# Patient Record
Sex: FEMALE | Race: WHITE | NOT HISPANIC OR LATINO | Employment: OTHER | URBAN - METROPOLITAN AREA
[De-identification: names, ages, dates, MRNs, and addresses within clinical notes are randomized per-mention and may not be internally consistent; named-entity substitution may affect disease eponyms.]

---

## 2023-03-01 LAB
ESTIMATED AVERAGE GLUCOSE FOR HBA1C: 163 MG/DL
HEMOGLOBIN A1C/HEMOGLOBIN TOTAL IN BLOOD: 7.3 %

## 2023-03-02 LAB
ANION GAP IN SER/PLAS: 16 MMOL/L (ref 10–20)
CALCIDIOL (25 OH VITAMIN D3) (NG/ML) IN SER/PLAS: 67 NG/ML
CALCIUM (MG/DL) IN SER/PLAS: 8.7 MG/DL (ref 8.6–10.6)
CARBON DIOXIDE, TOTAL (MMOL/L) IN SER/PLAS: 31 MMOL/L (ref 21–32)
CHLORIDE (MMOL/L) IN SER/PLAS: 101 MMOL/L (ref 98–107)
CHOLESTEROL (MG/DL) IN SER/PLAS: 148 MG/DL (ref 0–199)
CHOLESTEROL IN HDL (MG/DL) IN SER/PLAS: 45.8 MG/DL
CHOLESTEROL/HDL RATIO: 3.2
CREATININE (MG/DL) IN SER/PLAS: 1.51 MG/DL (ref 0.5–1.05)
GFR FEMALE: 37 ML/MIN/1.73M2
GLUCOSE (MG/DL) IN SER/PLAS: 115 MG/DL (ref 74–99)
LDL: 51 MG/DL (ref 0–99)
NON HDL CHOLESTEROL: 102 MG/DL
PARATHYRIN INTACT (PG/ML) IN SER/PLAS: 141.4 PG/ML (ref 18.5–88)
POTASSIUM (MMOL/L) IN SER/PLAS: 4.7 MMOL/L (ref 3.5–5.3)
SODIUM (MMOL/L) IN SER/PLAS: 143 MMOL/L (ref 136–145)
TRIGLYCERIDE (MG/DL) IN SER/PLAS: 254 MG/DL (ref 0–149)
UREA NITROGEN (MG/DL) IN SER/PLAS: 30 MG/DL (ref 6–23)
VLDL: 51 MG/DL (ref 0–40)

## 2023-10-20 ENCOUNTER — APPOINTMENT (OUTPATIENT)
Dept: PRIMARY CARE | Facility: CLINIC | Age: 73
End: 2023-10-20
Payer: MEDICARE

## 2023-11-04 PROBLEM — M79.642 HAND PAIN, LEFT: Status: ACTIVE | Noted: 2023-11-04

## 2023-11-04 PROBLEM — N20.0 KIDNEY STONES: Status: ACTIVE | Noted: 2023-11-04

## 2023-11-04 PROBLEM — G47.62 NOCTURNAL LEG CRAMPS: Status: ACTIVE | Noted: 2023-11-04

## 2023-11-04 PROBLEM — I25.10 CORONARY ARTERY DISEASE: Status: ACTIVE | Noted: 2023-11-04

## 2023-11-04 PROBLEM — E11.9 DIABETES MELLITUS (MULTI): Status: ACTIVE | Noted: 2023-11-04

## 2023-11-04 PROBLEM — E21.1 SECONDARY HYPERPARATHYROIDISM, NON-RENAL (MULTI): Status: ACTIVE | Noted: 2023-11-04

## 2023-11-04 PROBLEM — L50.9 URTICARIA, UNSPECIFIED: Status: ACTIVE | Noted: 2023-11-04

## 2023-11-04 PROBLEM — E89.2 STATUS POST PARATHYROIDECTOMY (CMS/HCC): Status: ACTIVE | Noted: 2023-11-04

## 2023-11-04 PROBLEM — R07.89 CHEST PAIN, ATYPICAL: Status: ACTIVE | Noted: 2023-11-04

## 2023-11-04 PROBLEM — R92.8 ABNORMAL MAMMOGRAM: Status: ACTIVE | Noted: 2023-11-04

## 2023-11-04 PROBLEM — E04.1 THYROID NODULE: Status: ACTIVE | Noted: 2023-11-04

## 2023-11-04 PROBLEM — E55.9 VITAMIN D DEFICIENCY: Status: ACTIVE | Noted: 2023-11-04

## 2023-11-04 PROBLEM — R60.0 LEG EDEMA: Status: ACTIVE | Noted: 2023-11-04

## 2023-11-04 PROBLEM — E21.3 HYPERPARATHYROIDISM (MULTI): Status: ACTIVE | Noted: 2023-11-04

## 2023-11-04 PROBLEM — Z95.5 HISTORY OF CORONARY ARTERY STENT PLACEMENT: Status: ACTIVE | Noted: 2023-11-04

## 2023-11-04 PROBLEM — E78.5 HYPERLIPIDEMIA: Status: ACTIVE | Noted: 2023-11-04

## 2023-11-04 PROBLEM — I10 HTN (HYPERTENSION): Status: ACTIVE | Noted: 2023-11-04

## 2023-11-04 PROBLEM — M79.641 PAIN OF RIGHT HAND: Status: ACTIVE | Noted: 2023-11-04

## 2023-11-04 PROBLEM — N18.30 CHRONIC RENAL DISEASE, STAGE III (MULTI): Status: ACTIVE | Noted: 2023-11-04

## 2023-11-04 PROBLEM — D64.9 ACUTE ANEMIA: Status: ACTIVE | Noted: 2023-11-04

## 2023-11-04 PROBLEM — M19.90 ARTHRITIS: Status: ACTIVE | Noted: 2023-11-04

## 2023-11-04 PROBLEM — E11.40 NEUROPATHY, DIABETIC (MULTI): Status: ACTIVE | Noted: 2023-11-04

## 2023-11-04 PROBLEM — Z95.1 HISTORY OF CORONARY ARTERY BYPASS GRAFT X 3: Status: ACTIVE | Noted: 2023-11-04

## 2023-11-04 PROBLEM — M54.9 BACK PAIN: Status: ACTIVE | Noted: 2023-11-04

## 2023-11-04 PROBLEM — E66.01 OBESITY, MORBID (MULTI): Status: ACTIVE | Noted: 2023-11-04

## 2023-11-04 PROBLEM — I50.30 DIASTOLIC CONGESTIVE HEART FAILURE (MULTI): Status: ACTIVE | Noted: 2023-11-04

## 2023-11-04 RX ORDER — TORSEMIDE 20 MG/1
1 TABLET ORAL DAILY
COMMUNITY
Start: 2020-06-04 | End: 2023-11-07 | Stop reason: SDUPTHER

## 2023-11-04 RX ORDER — METOPROLOL SUCCINATE 25 MG/1
1 TABLET, EXTENDED RELEASE ORAL DAILY
COMMUNITY
Start: 2018-02-15 | End: 2024-04-23

## 2023-11-04 RX ORDER — ASPIRIN 81 MG/1
1 TABLET ORAL DAILY
COMMUNITY
Start: 2021-07-01

## 2023-11-04 RX ORDER — ROSUVASTATIN CALCIUM 20 MG/1
1 TABLET, COATED ORAL DAILY
COMMUNITY
Start: 2018-07-03

## 2023-11-04 RX ORDER — POTASSIUM CITRATE 10 MEQ/1
1 TABLET, EXTENDED RELEASE ORAL DAILY
COMMUNITY
Start: 2019-09-27

## 2023-11-07 ENCOUNTER — OFFICE VISIT (OUTPATIENT)
Dept: CARDIOLOGY | Facility: CLINIC | Age: 73
End: 2023-11-07
Payer: MEDICARE

## 2023-11-07 VITALS
HEART RATE: 68 BPM | OXYGEN SATURATION: 93 % | SYSTOLIC BLOOD PRESSURE: 124 MMHG | HEIGHT: 69 IN | DIASTOLIC BLOOD PRESSURE: 76 MMHG | BODY MASS INDEX: 43.19 KG/M2 | WEIGHT: 291.6 LBS

## 2023-11-07 DIAGNOSIS — Z95.1 HISTORY OF CORONARY ARTERY BYPASS GRAFT X 3: ICD-10-CM

## 2023-11-07 DIAGNOSIS — I50.32 CHRONIC DIASTOLIC CONGESTIVE HEART FAILURE (MULTI): ICD-10-CM

## 2023-11-07 DIAGNOSIS — E78.2 MIXED HYPERLIPIDEMIA: ICD-10-CM

## 2023-11-07 DIAGNOSIS — I25.10 CORONARY ARTERY DISEASE INVOLVING NATIVE CORONARY ARTERY OF NATIVE HEART WITHOUT ANGINA PECTORIS: Primary | ICD-10-CM

## 2023-11-07 DIAGNOSIS — I10 PRIMARY HYPERTENSION: ICD-10-CM

## 2023-11-07 PROCEDURE — 3074F SYST BP LT 130 MM HG: CPT | Performed by: INTERNAL MEDICINE

## 2023-11-07 PROCEDURE — 1159F MED LIST DOCD IN RCRD: CPT | Performed by: INTERNAL MEDICINE

## 2023-11-07 PROCEDURE — 1126F AMNT PAIN NOTED NONE PRSNT: CPT | Performed by: INTERNAL MEDICINE

## 2023-11-07 PROCEDURE — 3078F DIAST BP <80 MM HG: CPT | Performed by: INTERNAL MEDICINE

## 2023-11-07 PROCEDURE — 99214 OFFICE O/P EST MOD 30 MIN: CPT | Performed by: INTERNAL MEDICINE

## 2023-11-07 PROCEDURE — 3051F HG A1C>EQUAL 7.0%<8.0%: CPT | Performed by: INTERNAL MEDICINE

## 2023-11-07 RX ORDER — INSULIN ASPART 100 [IU]/ML
15 INJECTION, SOLUTION INTRAVENOUS; SUBCUTANEOUS
COMMUNITY
Start: 2023-09-01

## 2023-11-07 RX ORDER — INSULIN GLARGINE 100 [IU]/ML
35 INJECTION, SOLUTION SUBCUTANEOUS 2 TIMES DAILY
COMMUNITY
Start: 2023-08-30

## 2023-11-07 RX ORDER — TORSEMIDE 20 MG/1
20 TABLET ORAL 2 TIMES DAILY
Qty: 180 TABLET | Refills: 3 | Status: SHIPPED | OUTPATIENT
Start: 2023-11-07 | End: 2024-11-06

## 2023-11-07 RX ORDER — ERGOCALCIFEROL 1.25 MG/1
1 CAPSULE ORAL
COMMUNITY
Start: 2023-10-18

## 2023-11-07 ASSESSMENT — ENCOUNTER SYMPTOMS
DYSPNEA ON EXERTION: 1
WEIGHT GAIN: 1

## 2023-11-07 NOTE — PROGRESS NOTES
Subjective   Kasie Peña is a 73 y.o. female.    Chief Complaint:  Follow-up coronary artery disease.    HPI    She is now 3 years post coronary bypass grafting.  No anginal symptoms.  Activity levels however are quite limited.  Mostly she is very concerned about her weight.  She had a great deal of difficulty trying to lose weight.  According the patient she has been a lifetime member to weight watchers.  Tolerating medications well.  No other medical issues or medical problems since her last visit.  Has had a history of kidney stones.    She presented to [facility] in 2020 with symptoms of unstable angina. Cardiac catheterization demonstrated severe three-vessel coronary artery disease. She had a 50% ostial stenosis of the left main coronary artery. The left anterior descending had a 60% stenosis. The circumflex had multiple lesions. The right coronary artery had a 50% ostial stenosis.     On May 4, 2020 she underwent coronary artery bypass grafting with a left internal mammary graft to the anterior descending, vein graft to the circumflex, a vein graft to the posterior descending branch of the right coronary artery.     Her past medical history is significant for hypertension, hyperlipidemia and diabetes. She does have a family history of heart disease.     In the past, she smoked two packs per day (but quit many years ago).     She has had a cholecystectomy and appendectomy.    Allergies  Medication    · Biaxin   Rash; Updated By: Adela Kendrick; 2021 3:33:54 PM     Family History  Mother    · Family history of coronary artery disease (V17.3) (Z82.49)   · Family history of diabetes mellitus (V18.0) (Z83.3)  Father    · Family history of coronary artery disease (V17.3) (Z82.49)   · Family history of diabetes mellitus (V18.0) (Z83.3)   · Family history of myocardial infarction (V17.3) (Z82.49)   ·  age 67 from MI  Sister    · Family history of diabetes mellitus (V18.0)  "(Z83.3)     Social History  Problems    · Former smoker (V15.82) (Z87.891)   ·    · Minimum alcohol consumption      Review of Systems   Constitutional: Positive for malaise/fatigue and weight gain.   Cardiovascular:  Positive for dyspnea on exertion. Negative for chest pain.   All other systems reviewed and are negative.      Visit Vitals  /76 (BP Location: Left arm, Patient Position: Sitting, BP Cuff Size: Large adult)   Pulse 68   Ht 1.753 m (5' 9\")   Wt 132 kg (291 lb 9.6 oz)   SpO2 93%   BMI 43.06 kg/m²   BSA 2.54 m²        Objective     Constitutional:       Appearance: Not in distress.   Neck:      Vascular: JVD normal.   Pulmonary:      Breath sounds: Normal breath sounds.   Cardiovascular:      Normal rate. Regular rhythm. Normal S1. Normal S2.       Murmurs: There is no murmur.      No gallop.    Pulses:     Intact distal pulses.   Edema:     Peripheral edema present.     Pretibial: bilateral 2+ edema of the pretibial area.     Ankle: bilateral 1+ edema of the ankle.  Abdominal:      General: There is no distension.      Palpations: Abdomen is soft.   Neurological:      Mental Status: Alert.         Lab Review:   Lab Results   Component Value Date     03/01/2023    K 4.7 03/01/2023     03/01/2023    CO2 31 03/01/2023    BUN 30 (H) 03/01/2023    CREATININE 1.51 (H) 03/01/2023    GLUCOSE 115 (H) 03/01/2023    CALCIUM 8.7 03/01/2023     Lab Results   Component Value Date    CHOL 148 03/01/2023    TRIG 254 (H) 03/01/2023    HDL 45.8 03/01/2023       Assessment:    1.  Coronary artery disease.  Status post coronary bypass grafting x3 in 2020.  Continue medical management.    2.  Hypertension.  Blood pressures are well controlled.    3.  Hyperlipidemia.  Most recent LDL is 56 on rosuvastatin therapy.    4.  Exertional dyspnea.  Probable underlying chronic obstructive lung disease secondary to long smoking history.    5.  Diastolic congestive heart failure.  On torsemide 20 mg daily.  " Does have increasing peripheral edema.  Both lower extremities are somewhat tense.  We are going to have her take torsemide 40 mg 3 days/week and 20 mg on the other days.  Most recent potassium is 4.7.  We can have her get blood work with her new primary care physician in the future.

## 2023-11-20 ENCOUNTER — APPOINTMENT (OUTPATIENT)
Dept: PRIMARY CARE | Facility: CLINIC | Age: 73
End: 2023-11-20
Payer: MEDICARE

## 2023-11-27 DIAGNOSIS — E11.9 TYPE 2 DIABETES MELLITUS WITHOUT COMPLICATION, WITHOUT LONG-TERM CURRENT USE OF INSULIN (MULTI): Primary | ICD-10-CM

## 2023-11-27 NOTE — TELEPHONE ENCOUNTER
THIS IS GOING TO BE A NEW PATIENT.  SHE WAS SCHEDULED FOR TOMORROW BUT NEEDED TO CANCEL DUE TO BEING SICK WITH COVID.  SHE RESCHEDULED HER APT TO THE BEGINNING OF DECEMBER WITH YOU BUT IS OUT OF HER  ONE TOUCH BHARATH TEST STRIPS  SHE IS TESTING 2-3 DAILY  [facility]

## 2023-11-28 ENCOUNTER — APPOINTMENT (OUTPATIENT)
Dept: PRIMARY CARE | Facility: CLINIC | Age: 73
End: 2023-11-28
Payer: MEDICARE

## 2023-12-04 ENCOUNTER — TELEPHONE (OUTPATIENT)
Dept: PRIMARY CARE | Facility: CLINIC | Age: 73
End: 2023-12-04
Payer: MEDICARE

## 2023-12-04 NOTE — TELEPHONE ENCOUNTER
PT HAS APPT 12/6/23. WAS COVID + 11/26/23 AND STILL IS TESTING + 11 DAYS LATER. CAN SHE COME IN OR RESCHEDULE  STILL CONGESTED AND MAY HAVE SINUS INFECTION. SHE HAS RX FOR AUGMENTIN AT HOME. CAN SHE TAKE IT?

## 2023-12-05 NOTE — TELEPHONE ENCOUNTER
Informed pt to not test anymore since can remain positive for weeks. Informed her she can come into office whenever shed like and can take augmentin. Pt did not want new script.

## 2023-12-06 ENCOUNTER — OFFICE VISIT (OUTPATIENT)
Dept: PRIMARY CARE | Facility: CLINIC | Age: 73
End: 2023-12-06
Payer: MEDICARE

## 2023-12-06 VITALS
BODY MASS INDEX: 41.18 KG/M2 | HEIGHT: 69 IN | DIASTOLIC BLOOD PRESSURE: 72 MMHG | HEART RATE: 72 BPM | SYSTOLIC BLOOD PRESSURE: 136 MMHG | WEIGHT: 278 LBS | OXYGEN SATURATION: 98 %

## 2023-12-06 DIAGNOSIS — I50.32 CHRONIC DIASTOLIC CONGESTIVE HEART FAILURE (MULTI): ICD-10-CM

## 2023-12-06 DIAGNOSIS — Z00.00 WELLNESS EXAMINATION: Primary | ICD-10-CM

## 2023-12-06 DIAGNOSIS — N18.30 STAGE 3 CHRONIC KIDNEY DISEASE, UNSPECIFIED WHETHER STAGE 3A OR 3B CKD (MULTI): ICD-10-CM

## 2023-12-06 DIAGNOSIS — E11.9 TYPE 2 DIABETES MELLITUS WITHOUT COMPLICATION, WITH LONG-TERM CURRENT USE OF INSULIN (MULTI): ICD-10-CM

## 2023-12-06 DIAGNOSIS — E78.5 HYPERLIPIDEMIA, UNSPECIFIED HYPERLIPIDEMIA TYPE: ICD-10-CM

## 2023-12-06 DIAGNOSIS — E21.3 HYPERPARATHYROIDISM (MULTI): ICD-10-CM

## 2023-12-06 DIAGNOSIS — E66.01 OBESITY, MORBID (MULTI): ICD-10-CM

## 2023-12-06 DIAGNOSIS — I10 HYPERTENSION, UNSPECIFIED TYPE: ICD-10-CM

## 2023-12-06 DIAGNOSIS — Z79.4 TYPE 2 DIABETES MELLITUS WITHOUT COMPLICATION, WITH LONG-TERM CURRENT USE OF INSULIN (MULTI): ICD-10-CM

## 2023-12-06 DIAGNOSIS — I25.10 CORONARY ARTERY DISEASE INVOLVING NATIVE CORONARY ARTERY OF NATIVE HEART WITHOUT ANGINA PECTORIS: ICD-10-CM

## 2023-12-06 LAB — POC HEMOGLOBIN A1C: 7.4 % (ref 4.2–6.5)

## 2023-12-06 PROCEDURE — 1160F RVW MEDS BY RX/DR IN RCRD: CPT | Performed by: INTERNAL MEDICINE

## 2023-12-06 PROCEDURE — 3075F SYST BP GE 130 - 139MM HG: CPT | Performed by: INTERNAL MEDICINE

## 2023-12-06 PROCEDURE — 99214 OFFICE O/P EST MOD 30 MIN: CPT | Performed by: INTERNAL MEDICINE

## 2023-12-06 PROCEDURE — 1159F MED LIST DOCD IN RCRD: CPT | Performed by: INTERNAL MEDICINE

## 2023-12-06 PROCEDURE — 1036F TOBACCO NON-USER: CPT | Performed by: INTERNAL MEDICINE

## 2023-12-06 PROCEDURE — 3078F DIAST BP <80 MM HG: CPT | Performed by: INTERNAL MEDICINE

## 2023-12-06 PROCEDURE — G0439 PPPS, SUBSEQ VISIT: HCPCS | Performed by: INTERNAL MEDICINE

## 2023-12-06 PROCEDURE — 3051F HG A1C>EQUAL 7.0%<8.0%: CPT | Performed by: INTERNAL MEDICINE

## 2023-12-06 PROCEDURE — 83036 HEMOGLOBIN GLYCOSYLATED A1C: CPT | Performed by: INTERNAL MEDICINE

## 2023-12-06 PROCEDURE — 1126F AMNT PAIN NOTED NONE PRSNT: CPT | Performed by: INTERNAL MEDICINE

## 2023-12-06 PROCEDURE — 1170F FXNL STATUS ASSESSED: CPT | Performed by: INTERNAL MEDICINE

## 2023-12-06 ASSESSMENT — ACTIVITIES OF DAILY LIVING (ADL)
DRESSING: INDEPENDENT
TAKING_MEDICATION: INDEPENDENT
GROCERY_SHOPPING: INDEPENDENT
BATHING: INDEPENDENT
MANAGING_FINANCES: INDEPENDENT
DOING_HOUSEWORK: INDEPENDENT

## 2023-12-06 ASSESSMENT — PATIENT HEALTH QUESTIONNAIRE - PHQ9
SUM OF ALL RESPONSES TO PHQ9 QUESTIONS 1 AND 2: 0
SUM OF ALL RESPONSES TO PHQ9 QUESTIONS 1 AND 2: 0
1. LITTLE INTEREST OR PLEASURE IN DOING THINGS: NOT AT ALL
2. FEELING DOWN, DEPRESSED OR HOPELESS: NOT AT ALL
1. LITTLE INTEREST OR PLEASURE IN DOING THINGS: NOT AT ALL
2. FEELING DOWN, DEPRESSED OR HOPELESS: NOT AT ALL

## 2023-12-06 ASSESSMENT — COLUMBIA-SUICIDE SEVERITY RATING SCALE - C-SSRS
2. HAVE YOU ACTUALLY HAD ANY THOUGHTS OF KILLING YOURSELF?: NO
6. HAVE YOU EVER DONE ANYTHING, STARTED TO DO ANYTHING, OR PREPARED TO DO ANYTHING TO END YOUR LIFE?: NO
1. IN THE PAST MONTH, HAVE YOU WISHED YOU WERE DEAD OR WISHED YOU COULD GO TO SLEEP AND NOT WAKE UP?: NO

## 2023-12-06 NOTE — PROGRESS NOTES
"Subjective   Patient ID: Kasie Peña is a 73 y.o. female who presents for the following  MEDICARE WELLNESS 12/6/23 AND THE FOLLOWING   Assessment/Plan   DM2: stable, A1c 7.4 (12/6/2023)  Morning sugars 120s   Basaglar 35 units bid   Novolog 14 units TID   CAD triple May 2020:  Asprin crestor metoprolol dr Villagomez    Vitamin d deficiency: stable     Hyperparathyroid s/p parathyroidectomy (3 of 4 nodules removed): stable, asymptomatic at this time  check pth and I calcium     DIASTOLIC CHF: stable euvolemic at this time     CKD stage 3 (cr 1.6-1.9): bmp and urine albumin  Avoid nsaids and dehydration    OA: recommend tylenol as needed and topical biofreeze    Kidney stones: stable patient takes potassium citrate ER 10meq follows with dr carvajal     Morbid obesity: stable, recommend diet and weight loss    Mammogram done April 2023     Patient defers colon cancer screening at this time but may consider spring 2024     HPI    Diabetes Type 2: patient denies any low blood sugars. Patient is following with opthalmology on a yearly basis. Patient is taking    HTN: patient denies any headaches, blurred vision or dizziness. patient denies any stroke like symptoms    CAD: denies any angina. denies any sob chest pain or pressure especially with exertion. patient follows with cardiology on a regular basis    social: patient denies any smoking, drug or alcohol abuse    family history: DM and heart attack issues in the family     surgical history:      Visit Vitals  /72   Pulse 72   Ht 1.753 m (5' 9\")   Wt 126 kg (278 lb)   SpO2 98%   BMI 41.05 kg/m²   Smoking Status Never   BSA 2.48 m²     PHYSICAL EXAM     General appearance: Alert and in no acute distress. speech is clear and coherent  HEENT: Sclera and conjunctiva white, EOMI, uvela midline, no mouth lesions. PERRLA,  TM's Pan with cone of light, external ear canal with scant cerumen. No head trauma  Neck: no carotid bruits or thyromegaly. no lymphadenopathy   " Respiratory : No respiratory distress, normal respiratory rhythm and effort. Clear bilateral breath sounds. No wheezing or rhonchi.   Cardiovascular: heart rate regular, S1, S2. no murmurs. no Lower extremity edema  Skin inspection: Normal skin color and pigmentation, normal skin turgor and no visible rash, induration, or cellulitis  MSK: 5/5 strength upper and lower extremities without gait abnormalities. no loss of muscle mass   Neuro: 2-12 CN grossly intact.  no slurred speech. no lateralizing deficit  Psychiatric Orientation: Oriented to person, place, and time. no depression, homicidal or suicidal thoughts, normal affect     REVIEW OF SYSTEMS     Constitutional: not feeling tired and no fever, chills or sweats.  no headache  Cardiovascular: no exertional chest pain, no palpitations, no lower extremity edema and no intermittent leg claudication.   Lungs: Denies shortness of breath, exertional dyspnea, wheezing  Gastrointestinal: no change in bowel habits, no diarrhea, no nausea, no vomiting and no abdominal pain. Denies Melena, brbpr or dark stool  Musculoskeletal: no myalgias, no muscle weakness and no limb swelling.   Skin: no rashes, no change in skin color and pigmentation and no skin lumps.   Neurological: no headaches, no seizures, no numbness, no lateralizing deficits and no fainting.   Psychiatric: no depression and no anxiety.   Urine: denies polyuria, hematuria, dysuria   Endocrine: no cold intolerance, no heat intolerance    Allergies   Allergen Reactions    Bactrim [Sulfamethoxazole-Trimethoprim] Swelling       Current Outpatient Medications   Medication Sig Dispense Refill    aspirin 81 mg EC tablet Take 1 tablet (81 mg) by mouth once daily.      Basaglar KwikPen U-100 Insulin 100 unit/mL (3 mL) pen Inject 35 Units under the skin 2 times a day.      blood sugar diagnostic strip Test blood sugars two to three times daily. 200 each 3    ergocalciferol (Vitamin D-2) 1.25 MG (41730 UT) capsule Take 1  capsule (1,250 mcg) by mouth 1 (one) time per week.      metoprolol succinate XL (Toprol-XL) 25 mg 24 hr tablet Take 1 tablet (25 mg) by mouth once daily.      NovoLOG FlexPen U-100 Insulin 100 unit/mL (3 mL) pen Inject 15 Units under the skin 3 times a day with meals.      potassium citrate CR (Urocit-K-10) 10 mEq ER tablet Take 1 tablet (10 mEq) by mouth once daily.      rosuvastatin (Crestor) 20 mg tablet Take 1 tablet (20 mg) by mouth once daily.      torsemide (Demadex) 20 mg tablet Take 1 tablet (20 mg) by mouth 2 times a day. 180 tablet 3     No current facility-administered medications for this visit.       Objective     No visits with results within 4 Month(s) from this visit.   Latest known visit with results is:   Orders Only on 03/01/2023   Component Date Value Ref Range Status    Cholesterol 03/01/2023 148  0 - 199 mg/dL Final    HDL 03/01/2023 45.8  mg/dL Final    Cholesterol/HDL Ratio 03/01/2023 3.2   Final    LDL 03/01/2023 51  0 - 99 mg/dL Final    VLDL 03/01/2023 51 (H)  0 - 40 mg/dL Final    Triglycerides 03/01/2023 254 (H)  0 - 149 mg/dL Final    Non HDL Cholesterol 03/01/2023 102  mg/dL Final       Radiology: Reviewed imaging in powerchart.  No results found.    No family history on file.  Social History     Socioeconomic History    Marital status:      Spouse name: None    Number of children: None    Years of education: None    Highest education level: None   Occupational History    None   Tobacco Use    Smoking status: Never    Smokeless tobacco: Never   Substance and Sexual Activity    Alcohol use: Never    Drug use: Never    Sexual activity: None   Other Topics Concern    None   Social History Narrative    None     Social Determinants of Health     Financial Resource Strain: Not on file   Food Insecurity: Not on file   Transportation Needs: Not on file   Physical Activity: Not on file   Stress: Not on file   Social Connections: Not on file   Intimate Partner Violence: Not on file    Housing Stability: Not on file     Past Medical History:   Diagnosis Date    Essential (primary) hypertension     Benign essential HTN    Old myocardial infarction     History of myocardial infarction    Personal history of other diseases of the circulatory system 02/15/2018    History of coronary artery disease    Personal history of other endocrine, nutritional and metabolic disease 06/15/2018    History of hyperlipidemia    Personal history of other endocrine, nutritional and metabolic disease     History of hypercholesterolemia     Past Surgical History:   Procedure Laterality Date    APPENDECTOMY  03/19/2014    Appendectomy    CHOLECYSTECTOMY  03/19/2014    Cholecystectomy    OOPHORECTOMY  03/19/2014    Oophorectomy    OTHER SURGICAL HISTORY  06/04/2020    Coronary artery bypass graft    OTHER SURGICAL HISTORY  06/28/2016    Cath Stent Placement Number Of Stents Placed:    TONSILLECTOMY  03/19/2014    Tonsillectomy       Charting was completed using voice recognition technology and may include unintended errors.

## 2023-12-08 ENCOUNTER — TELEPHONE (OUTPATIENT)
Dept: PRIMARY CARE | Facility: CLINIC | Age: 73
End: 2023-12-08
Payer: MEDICARE

## 2023-12-08 DIAGNOSIS — J01.90 ACUTE SINUSITIS, RECURRENCE NOT SPECIFIED, UNSPECIFIED LOCATION: ICD-10-CM

## 2023-12-08 DIAGNOSIS — J01.90 ACUTE SINUSITIS, RECURRENCE NOT SPECIFIED, UNSPECIFIED LOCATION: Primary | ICD-10-CM

## 2023-12-08 RX ORDER — AMOXICILLIN AND CLAVULANATE POTASSIUM 500; 125 MG/1; MG/1
500 TABLET, FILM COATED ORAL 3 TIMES DAILY
Qty: 30 TABLET | Refills: 0 | Status: SHIPPED | OUTPATIENT
Start: 2023-12-08 | End: 2023-12-08 | Stop reason: SDUPTHER

## 2023-12-08 RX ORDER — AMOXICILLIN AND CLAVULANATE POTASSIUM 500; 125 MG/1; MG/1
500 TABLET, FILM COATED ORAL 2 TIMES DAILY
Qty: 20 TABLET | Refills: 0 | Status: SHIPPED | OUTPATIENT
Start: 2023-12-08 | End: 2023-12-18

## 2023-12-08 NOTE — TELEPHONE ENCOUNTER
PT was in office 12/06/23 and  said if need be he would call in Augmentin.  They were talking about other things, but she now thinks this issue for her has turned into a sinus infection.      She is requesting Dr call in Augmentin as he suggested.

## 2024-04-08 ENCOUNTER — APPOINTMENT (OUTPATIENT)
Dept: PRIMARY CARE | Facility: CLINIC | Age: 74
End: 2024-04-08
Payer: MEDICARE

## 2024-04-15 ENCOUNTER — APPOINTMENT (OUTPATIENT)
Dept: PRIMARY CARE | Facility: CLINIC | Age: 74
End: 2024-04-15
Payer: MEDICARE

## 2024-04-18 PROBLEM — D64.9 ACUTE ANEMIA: Status: RESOLVED | Noted: 2023-11-04 | Resolved: 2024-04-18

## 2024-04-18 PROBLEM — I10 HTN (HYPERTENSION): Status: RESOLVED | Noted: 2023-11-04 | Resolved: 2024-04-18

## 2024-04-18 PROBLEM — R92.8 ABNORMAL MAMMOGRAM: Status: RESOLVED | Noted: 2023-11-04 | Resolved: 2024-04-18

## 2024-04-18 PROBLEM — L50.9 URTICARIA, UNSPECIFIED: Status: RESOLVED | Noted: 2023-11-04 | Resolved: 2024-04-18

## 2024-04-19 ENCOUNTER — APPOINTMENT (OUTPATIENT)
Dept: PRIMARY CARE | Facility: CLINIC | Age: 74
End: 2024-04-19
Payer: MEDICARE

## 2024-04-23 DIAGNOSIS — R07.89 CHEST PAIN, ATYPICAL: ICD-10-CM

## 2024-04-23 RX ORDER — METOPROLOL SUCCINATE 25 MG/1
25 TABLET, EXTENDED RELEASE ORAL DAILY
Qty: 90 TABLET | Refills: 3 | Status: SHIPPED | OUTPATIENT
Start: 2024-04-23

## 2024-05-01 LAB
NON-UH HIE A/G RATIO: 1.4
NON-UH HIE ALB: 3.8 G/DL (ref 3.4–5)
NON-UH HIE ALK PHOS: 105 UNIT/L (ref 45–117)
NON-UH HIE BILIRUBIN, TOTAL: 0.7 MG/DL (ref 0.3–1.2)
NON-UH HIE BUN/CREAT RATIO: 23.1
NON-UH HIE BUN: 37 MG/DL (ref 9–23)
NON-UH HIE CALCIUM, IONIZED: 1.15 MMOL/L (ref 1.12–1.32)
NON-UH HIE CALCIUM: 9 MG/DL (ref 8.7–10.4)
NON-UH HIE CALCULATED LDL CHOLESTEROL: 61 MG/DL (ref 60–130)
NON-UH HIE CALCULATED OSMOLALITY: 299 MOSM/KG (ref 275–295)
NON-UH HIE CHLORIDE: 107 MMOL/L (ref 98–107)
NON-UH HIE CHOLESTEROL: 127 MG/DL (ref 100–200)
NON-UH HIE CO2, VENOUS: 29 MMOL/L (ref 20–31)
NON-UH HIE CREATININE, URINE MG/DL: 41.4 MG/DL
NON-UH HIE CREATININE: 1.6 MG/DL (ref 0.5–0.8)
NON-UH HIE GFR AA: 38
NON-UH HIE GLOBULIN: 2.8 G/DL
NON-UH HIE GLOMERULAR FILTRATION RATE: 32 ML/MIN/1.73M?
NON-UH HIE GLUCOSE: 162 MG/DL (ref 74–106)
NON-UH HIE GOT: 15 UNIT/L (ref 15–37)
NON-UH HIE GPT: 15 UNIT/L (ref 10–49)
NON-UH HIE HCT: 36.7 % (ref 36–46)
NON-UH HIE HDL CHOLESTEROL: 47 MG/DL (ref 40–60)
NON-UH HIE HGB: 12.1 G/DL (ref 12–16)
NON-UH HIE I-PTH: 155 PG/ML (ref 18.4–80.1)
NON-UH HIE INSTR WBC ND: 6.1
NON-UH HIE K: 4.9 MMOL/L (ref 3.5–5.1)
NON-UH HIE MCH: 29.6 PG (ref 27–34)
NON-UH HIE MCHC: 33 G/DL (ref 32–37)
NON-UH HIE MCV: 89.6 FL (ref 80–100)
NON-UH HIE MICROALBUMIN, URINE MG/L: 20 MG/L
NON-UH HIE MICROALBUMIN/CREATININE RATIO: 48 MG MALB/GM CREAT (ref 0–30)
NON-UH HIE MPV: 10.1 FL (ref 7.4–10.4)
NON-UH HIE NA: 144 MMOL/L (ref 135–145)
NON-UH HIE PLATELET: 153 X10 (ref 150–450)
NON-UH HIE RBC: 4.1 X10 (ref 4.2–5.4)
NON-UH HIE RDW: 15.6 % (ref 11.5–14.5)
NON-UH HIE TOTAL CHOL/HDL CHOL RATIO: 2.7
NON-UH HIE TOTAL PROTEIN: 6.6 G/DL (ref 5.7–8.2)
NON-UH HIE TRIGLYCERIDES: 94 MG/DL (ref 30–150)
NON-UH HIE TSH: 4.34 UIU/ML (ref 0.55–4.78)
NON-UH HIE VIT D 25: 79 NG/ML
NON-UH HIE WBC: 6.1 X10 (ref 4.5–11)

## 2024-05-07 ENCOUNTER — OFFICE VISIT (OUTPATIENT)
Dept: CARDIOLOGY | Facility: CLINIC | Age: 74
End: 2024-05-07
Payer: MEDICARE

## 2024-05-07 VITALS
OXYGEN SATURATION: 98 % | BODY MASS INDEX: 43.4 KG/M2 | SYSTOLIC BLOOD PRESSURE: 126 MMHG | HEIGHT: 69 IN | HEART RATE: 60 BPM | WEIGHT: 293 LBS | DIASTOLIC BLOOD PRESSURE: 78 MMHG

## 2024-05-07 DIAGNOSIS — E66.01 OBESITY, MORBID (MULTI): ICD-10-CM

## 2024-05-07 DIAGNOSIS — I25.10 CORONARY ARTERY DISEASE INVOLVING NATIVE CORONARY ARTERY OF NATIVE HEART WITHOUT ANGINA PECTORIS: ICD-10-CM

## 2024-05-07 DIAGNOSIS — I50.32 CHRONIC DIASTOLIC CONGESTIVE HEART FAILURE (MULTI): ICD-10-CM

## 2024-05-07 DIAGNOSIS — R06.09 EXERTIONAL DYSPNEA: ICD-10-CM

## 2024-05-07 DIAGNOSIS — E78.2 MIXED HYPERLIPIDEMIA: ICD-10-CM

## 2024-05-07 DIAGNOSIS — Z95.5 HISTORY OF CORONARY ARTERY STENT PLACEMENT: ICD-10-CM

## 2024-05-07 DIAGNOSIS — R60.0 LEG EDEMA: ICD-10-CM

## 2024-05-07 DIAGNOSIS — R07.89 CHEST PAIN, ATYPICAL: Primary | ICD-10-CM

## 2024-05-07 DIAGNOSIS — Z95.1 HISTORY OF CORONARY ARTERY BYPASS GRAFT X 3: ICD-10-CM

## 2024-05-07 PROCEDURE — 93000 ELECTROCARDIOGRAM COMPLETE: CPT | Performed by: INTERNAL MEDICINE

## 2024-05-07 PROCEDURE — 1159F MED LIST DOCD IN RCRD: CPT | Performed by: INTERNAL MEDICINE

## 2024-05-07 PROCEDURE — 99214 OFFICE O/P EST MOD 30 MIN: CPT | Performed by: INTERNAL MEDICINE

## 2024-05-07 PROCEDURE — 3078F DIAST BP <80 MM HG: CPT | Performed by: INTERNAL MEDICINE

## 2024-05-07 PROCEDURE — 1036F TOBACCO NON-USER: CPT | Performed by: INTERNAL MEDICINE

## 2024-05-07 PROCEDURE — 3074F SYST BP LT 130 MM HG: CPT | Performed by: INTERNAL MEDICINE

## 2024-05-07 RX ORDER — PEN NEEDLE, DIABETIC 31 GX5/16"
NEEDLE, DISPOSABLE MISCELLANEOUS
COMMUNITY
Start: 2024-04-26

## 2024-05-07 ASSESSMENT — ENCOUNTER SYMPTOMS: DYSPNEA ON EXERTION: 1

## 2024-05-07 NOTE — PATIENT INSTRUCTIONS
We would recommend that you consider Ozempic or Monjaro for your diabetes and for weight loss.

## 2024-05-07 NOTE — PROGRESS NOTES
Subjective   Kasie Peña is a 73 y.o. female.    Chief Complaint:  Follow-up coronary artery disease.  Exertional dyspnea.    HPI    She is now 4 years post coronary artery bypass grafting which was performed in 2020.  She has had no anginal symptoms.  From a cardiac standpoint she has done well.  She continues to struggle with arthritis issues and with weight loss.  She has had no other medical issues and no hospitalizations or emergency room visits.    She presented to [facility] in 2020 with symptoms of unstable angina. Cardiac catheterization demonstrated severe three-vessel coronary artery disease. She had a 50% ostial stenosis of the left main coronary artery. The left anterior descending had a 60% stenosis. The circumflex had multiple lesions. The right coronary artery had a 50% ostial stenosis.     On May 4, 2020 she underwent coronary artery bypass grafting with a left internal mammary graft to the anterior descending, vein graft to the circumflex, a vein graft to the posterior descending branch of the right coronary artery.     Her past medical history is significant for hypertension, hyperlipidemia and diabetes. She does have a family history of heart disease.     In the past, she smoked two packs per day (but quit many years ago).     She has had a cholecystectomy and appendectomy.     Allergies  Medication    · Biaxin   Rash; Updated By: Adela Kendrick; 2021 3:33:54 PM     Family History  Mother    · Family history of coronary artery disease (V17.3) (Z82.49)   · Family history of diabetes mellitus (V18.0) (Z83.3)  Father    · Family history of coronary artery disease (V17.3) (Z82.49)   · Family history of diabetes mellitus (V18.0) (Z83.3)   · Family history of myocardial infarction (V17.3) (Z82.49)   ·  age 67 from MI  Sister    · Family history of diabetes mellitus (V18.0) (Z83.3)     Social History  Problems    · Former smoker (V15.82) (Z87.891)   ·  "   · Minimum alcohol consumption    Review of Systems   Constitutional: Positive for malaise/fatigue.   Cardiovascular:  Positive for dyspnea on exertion.   Musculoskeletal:  Positive for arthritis.   All other systems reviewed and are negative.      Visit Vitals  /78 (BP Location: Right arm)   Pulse 60   Ht 1.753 m (5' 9\")   Wt 133 kg (293 lb)   SpO2 98%   BMI 43.27 kg/m²   Smoking Status Never   BSA 2.54 m²        Objective     Constitutional:       Appearance: Not in distress.   Neck:      Vascular: JVD normal.   Pulmonary:      Breath sounds: Normal breath sounds.   Cardiovascular:      Normal rate. Regular rhythm. Normal S1. Normal S2.       Murmurs: There is no murmur.      No gallop.    Pulses:     Intact distal pulses.   Edema:     Pretibial: bilateral 1+ edema of the pretibial area.     Ankle: bilateral 1+ edema of the ankle.  Abdominal:      General: There is no distension.      Palpations: Abdomen is soft.   Neurological:      Mental Status: Alert.         Lab Review:   Lab Results   Component Value Date     03/01/2023    K 4.7 03/01/2023     03/01/2023    CO2 31 03/01/2023    BUN 30 (H) 03/01/2023    CREATININE 1.51 (H) 03/01/2023    GLUCOSE 115 (H) 03/01/2023    CALCIUM 8.7 03/01/2023       Assessment:    1.  Coronary artery disease.  Status post coronary bypass grafting.  Doing well 4 years post bypass.  No anginal symptoms.  Continue single antiplatelet therapy.    2.  Hypertension.  Blood pressures are normal on today's visit.    3.  Diastolic congestive heart failure.  No heart failure by exam.  Oxygen saturation is 98%.  Lungs are clear.    4.  Exertional dyspnea may have some underlying chronic obstructive lung disease secondary to a past smoking history.    5.  Obesity.  Have suggested that she look into the use of Ozempic or Mounjaro.  She is to discuss this with her primary care physician.  "

## 2024-05-14 ENCOUNTER — OFFICE VISIT (OUTPATIENT)
Dept: PRIMARY CARE | Facility: CLINIC | Age: 74
End: 2024-05-14
Payer: MEDICARE

## 2024-05-14 VITALS
WEIGHT: 293 LBS | OXYGEN SATURATION: 95 % | BODY MASS INDEX: 43.86 KG/M2 | DIASTOLIC BLOOD PRESSURE: 78 MMHG | HEART RATE: 58 BPM | SYSTOLIC BLOOD PRESSURE: 121 MMHG

## 2024-05-14 DIAGNOSIS — E78.2 MIXED HYPERLIPIDEMIA: ICD-10-CM

## 2024-05-14 DIAGNOSIS — E66.01 OBESITY, MORBID (MULTI): ICD-10-CM

## 2024-05-14 DIAGNOSIS — E21.3 HYPERPARATHYROIDISM (MULTI): ICD-10-CM

## 2024-05-14 DIAGNOSIS — Z79.4 TYPE 2 DIABETES MELLITUS WITHOUT COMPLICATION, WITH LONG-TERM CURRENT USE OF INSULIN (MULTI): ICD-10-CM

## 2024-05-14 DIAGNOSIS — E11.9 TYPE 2 DIABETES MELLITUS WITHOUT COMPLICATION, WITH LONG-TERM CURRENT USE OF INSULIN (MULTI): ICD-10-CM

## 2024-05-14 DIAGNOSIS — N18.30 STAGE 3 CHRONIC KIDNEY DISEASE, UNSPECIFIED WHETHER STAGE 3A OR 3B CKD (MULTI): ICD-10-CM

## 2024-05-14 LAB — POC HEMOGLOBIN A1C: 7.8 % (ref 4.2–6.5)

## 2024-05-14 PROCEDURE — 83036 HEMOGLOBIN GLYCOSYLATED A1C: CPT | Performed by: INTERNAL MEDICINE

## 2024-05-14 PROCEDURE — 1159F MED LIST DOCD IN RCRD: CPT | Performed by: INTERNAL MEDICINE

## 2024-05-14 PROCEDURE — 3078F DIAST BP <80 MM HG: CPT | Performed by: INTERNAL MEDICINE

## 2024-05-14 PROCEDURE — 1036F TOBACCO NON-USER: CPT | Performed by: INTERNAL MEDICINE

## 2024-05-14 PROCEDURE — 3074F SYST BP LT 130 MM HG: CPT | Performed by: INTERNAL MEDICINE

## 2024-05-14 PROCEDURE — 99214 OFFICE O/P EST MOD 30 MIN: CPT | Performed by: INTERNAL MEDICINE

## 2024-05-14 PROCEDURE — G2211 COMPLEX E/M VISIT ADD ON: HCPCS | Performed by: INTERNAL MEDICINE

## 2024-05-14 NOTE — PROGRESS NOTES
Subjective   Patient ID: Kasie Peña is a 73 y.o. female who presents for the following  Chronic medical care  MEDICARE WELLNESS 12/6/23    Assessment/Plan   DM2: stable,   A1c 7.4 (12/6/2023) --> 7.8 (5/14/2024)  Morning sugars 120s   Basaglar 35 units bid   Novolog 14 units TID     CAD triple May 2020:  Asprin crestor metoprolol dr Villagomez    Vitamin d deficiency: stable     Hyperparathyroid s/p parathyroidectomy (3 of 4 nodules removed): stable, asymptomatic at this time  check pth and I calcium     DIASTOLIC CHF: stable euvolemic at this time     CKD stage 3 (cr 1.6-1.9): bmp and urine albumin  Avoid nsaids and dehydration    OA: recommend tylenol as needed and topical biofreeze    Kidney stones: stable patient takes potassium citrate ER 10meq follows with dr carvajal     Morbid obesity: stable, recommend diet and weight loss; discussed     Mammogram done April 2023     Patient defers colon cancer screening at this time but may consider summer 2024     HPI    Diabetes Type 2: patient denies any low blood sugars. Patient is following with opthalmology on a yearly basis. Patient is taking    HTN: patient denies any headaches, blurred vision or dizziness. patient denies any stroke like symptoms    CAD: denies any angina. denies any sob chest pain or pressure especially with exertion. patient follows with cardiology on a regular basis    social: patient denies any smoking, drug or alcohol abuse    family history: DM and heart attack issues in the family          Visit Vitals  Wt 135 kg (297 lb)   BMI 43.86 kg/m²   Smoking Status Never   BSA 2.56 m²     PHYSICAL EXAM     General appearance: Alert and in no acute distress. speech is clear and coherent  HEENT: Sclera and conjunctiva white, EOMI,      Respiratory : No respiratory distress, normal respiratory rhythm and effort. Clear bilateral breath sounds. No wheezing or rhonchi.   Cardiovascular: heart rate regular, S1, S2. no murmurs. no Lower extremity edema  Skin  "inspection: Normal skin color and pigmentation, normal skin turgor and no visible rash, induration, or cellulitis  MSK: 5/5 strength upper and lower extremities without gait abnormalities. no loss of muscle mass   Neuro: 2-12 CN grossly intact.  no slurred speech. no lateralizing deficit  Psychiatric Orientation: Oriented to person, place, and time. no depression, homicidal or suicidal thoughts, normal affect     REVIEW OF SYSTEMS     Constitutional: not feeling tired and no fever, chills or sweats.  no headache  Cardiovascular: no exertional chest pain, no palpitations, no lower extremity edema    Lungs: Denies shortness of breath, exertional dyspnea, wheezing  Gastrointestinal: no change in bowel habits, no diarrhea, no nausea, no vomiting and no abdominal pain.    Musculoskeletal: no myalgias, no muscle weakness and no limb swelling.   Skin: no rashes, no change in skin color and pigmentation and no skin lumps.   Neurological: no headaches, no seizures, no numbness, no lateralizing deficits and no fainting.   Psychiatric: no depression and no anxiety.   Urine: denies polyuria, hematuria, dysuria      Allergies   Allergen Reactions    Bactrim [Sulfamethoxazole-Trimethoprim] Swelling       Current Outpatient Medications   Medication Sig Dispense Refill    aspirin 81 mg EC tablet Take 1 tablet (81 mg) by mouth once daily.      BD Ultra-Fine Mini Pen Needle 31 gauge x 3/16\" needle USE THREE TIMES DAILY FOR DM CONTROL AND MONITORING      blood sugar diagnostic strip Test blood sugars two to three times daily. 200 each 3    ergocalciferol (Vitamin D-2) 1.25 MG (55405 UT) capsule Take 1 capsule (1,250 mcg) by mouth 1 (one) time per week.      metoprolol succinate XL (Toprol-XL) 25 mg 24 hr tablet TAKE 1 TABLET BY MOUTH EVERY DAY 90 tablet 3    potassium citrate CR (Urocit-K-10) 10 mEq ER tablet Take 1 tablet (10 mEq) by mouth once daily.      rosuvastatin (Crestor) 20 mg tablet Take 1 tablet (20 mg) by mouth once daily. "      torsemide (Demadex) 20 mg tablet Take 1 tablet (20 mg) by mouth 2 times a day. 180 tablet 3    Basaglar KwikPen U-100 Insulin 100 unit/mL (3 mL) pen Inject 35 Units under the skin 2 times a day.      NovoLOG FlexPen U-100 Insulin 100 unit/mL (3 mL) pen Inject 15 Units under the skin 3 times a day with meals.       No current facility-administered medications for this visit.       Objective     Orders Only on 05/01/2024   Component Date Value Ref Range Status    [facility] Calcium, Ionized 05/01/2024 1.15  1.12 - 1.32 mmol/L Final    [facility] MCH 05/01/2024 29.6  27.0 - 34.0 pg Final    [facility] HCT 05/01/2024 36.7  36.0 - 46.0 % Final    [facility] Platelet 05/01/2024 153  150 - 450 x10 Final    [facility] RBC 05/01/2024 4.10 (L)  4.20 - 5.40 x10 Final    [facility] Instr WBC ND 05/01/2024 6.1   Final    [facility] MCHC 05/01/2024 33.0  32.0 - 37.0 g/dL Final    [facility] MCV 05/01/2024 89.6  80.0 - 100.0 fL Final    [facility] HGB 05/01/2024 12.1  12.0 - 16.0 g/dL Final    [facility] MPV 05/01/2024 10.1  7.4 - 10.4 fL Final    [facility] WBC 05/01/2024 6.1  4.5 - 11.0 x10 Final    [facility] RDW 05/01/2024 15.6 (H)  11.5 - 14.5 % Final    [facility] Total Protein 05/01/2024 6.6  5.7 - 8.2 g/dL Final    [facility] CO2, venous 05/01/2024 29.0  20.0 - 31.0 mmol/L Final    [facility] Glomerular Filtration R* 05/01/2024 32  mL/min/1.73m? Final    [facility] Calculated Osmolality 05/01/2024 299 (H)  275 - 295 mOsm/kg Final    [facility] K 05/01/2024 4.9  3.5 - 5.1 mmol/L Final    [facility] Globulin 05/01/2024 2.8  g/dL Final    [facility] BUN 05/01/2024 37 (H)  9 - 23 mg/dL Final    [facility] Calcium 05/01/2024 9.0  8.7 - 10.4 mg/dL Final    [facility] ALB 05/01/2024 3.8  3.4 - 5.0 g/dL Final    [facility] GOT 05/01/2024 15  15 - 37 unit/L Final    [facility] Glucose 05/01/2024 162 (H)  74 - 106 mg/dL Final    [facility] GFR AA 05/01/2024 38   Final    [facility] Chloride 05/01/2024 107  98 - 107 mmol/L Final     [facility] Bilirubin, Total 05/01/2024 0.70  0.30 - 1.20 mg/dL Final    [facility] A/G Ratio 05/01/2024 1.4   Final    [facility] Na 05/01/2024 144  135 - 145 mmol/L Final    [facility] BUN/Creat Ratio 05/01/2024 23.1   Final    [facility] Alk Phos 05/01/2024 105  45 - 117 unit/L Final    [facility] GPT 05/01/2024 15  10 - 49 unit/L Final    [facility] Creatinine 05/01/2024 1.6 (H)  0.5 - 0.8 mg/dL Final    [facility] Vit D 25 05/01/2024 79  ng/mL Final    [facility] Total Chol/HDL Chol Rat* 05/01/2024 2.7   Final    [facility] Triglycerides 05/01/2024 94  30 - 150 mg/dL Final    [facility] Cholesterol 05/01/2024 127  100 - 200 mg/dL Final    [facility] Calculated LDL Choleste* 05/01/2024 61  60 - 130 mg/dL Final    [facility] HDL Cholesterol 05/01/2024 47  40 - 60 mg/dL Final    [facility] TSH 05/01/2024 4.34  0.55 - 4.78 uIU/ml Final    [facility] Creatinine, Urine mg/dl 05/01/2024 41.4  mg/dL Final    [facility] Microalbumin, Urine mg/L 05/01/2024 20.0  mg/L Final    [facility] Microalbumin/Creatinine* 05/01/2024 48 (H)  0 - 30 mg MALB/gm CREAT Final    [facility] I-PTH 05/01/2024 155.0 (H)  18.4 - 80.1 pg/mL Final       Radiology: Reviewed imaging in powerchart.  No results found.    No family history on file.  Social History     Socioeconomic History    Marital status:      Spouse name: Not on file    Number of children: Not on file    Years of education: Not on file    Highest education level: Not on file   Occupational History    Not on file   Tobacco Use    Smoking status: Never    Smokeless tobacco: Never   Substance and Sexual Activity    Alcohol use: Never    Drug use: Never    Sexual activity: Not on file   Other Topics Concern    Not on file   Social History Narrative    Not on file     Social Determinants of Health     Financial Resource Strain: Not on file   Food Insecurity: Not on file   Transportation Needs: Not on file   Physical Activity: Not on file   Stress: Not on file   Social  Connections: Not on file   Intimate Partner Violence: Not on file   Housing Stability: Not on file     Past Medical History:   Diagnosis Date    Essential (primary) hypertension     Benign essential HTN    Old myocardial infarction     History of myocardial infarction    Personal history of other diseases of the circulatory system 02/15/2018    History of coronary artery disease    Personal history of other endocrine, nutritional and metabolic disease 06/15/2018    History of hyperlipidemia    Personal history of other endocrine, nutritional and metabolic disease     History of hypercholesterolemia     Past Surgical History:   Procedure Laterality Date    APPENDECTOMY  03/19/2014    Appendectomy    CHOLECYSTECTOMY  03/19/2014    Cholecystectomy    OOPHORECTOMY  03/19/2014    Oophorectomy    OTHER SURGICAL HISTORY  06/04/2020    Coronary artery bypass graft    OTHER SURGICAL HISTORY  06/28/2016    Cath Stent Placement Number Of Stents Placed:    TONSILLECTOMY  03/19/2014    Tonsillectomy       Charting was completed using voice recognition technology and may include unintended errors.

## 2024-06-24 DIAGNOSIS — E55.9 VITAMIN D DEFICIENCY: ICD-10-CM

## 2024-06-24 RX ORDER — ERGOCALCIFEROL 1.25 MG/1
1 CAPSULE ORAL
Qty: 13 CAPSULE | Refills: 3 | Status: SHIPPED | OUTPATIENT
Start: 2024-06-24

## 2024-08-14 ENCOUNTER — APPOINTMENT (OUTPATIENT)
Dept: PRIMARY CARE | Facility: CLINIC | Age: 74
End: 2024-08-14
Payer: MEDICARE

## 2024-08-14 DIAGNOSIS — E78.2 MIXED HYPERLIPIDEMIA: ICD-10-CM

## 2024-08-14 RX ORDER — ROSUVASTATIN CALCIUM 20 MG/1
20 TABLET, COATED ORAL DAILY
Qty: 90 TABLET | Refills: 3 | Status: SHIPPED | OUTPATIENT
Start: 2024-08-14

## 2024-08-27 ENCOUNTER — APPOINTMENT (OUTPATIENT)
Dept: RADIOLOGY | Facility: CLINIC | Age: 74
End: 2024-08-27
Payer: MEDICARE

## 2024-08-27 DIAGNOSIS — Z12.31 SCREENING MAMMOGRAM FOR BREAST CANCER: ICD-10-CM

## 2024-08-28 ENCOUNTER — HOSPITAL ENCOUNTER (OUTPATIENT)
Dept: RADIOLOGY | Facility: CLINIC | Age: 74
Discharge: HOME | End: 2024-08-28
Payer: MEDICARE

## 2024-08-28 DIAGNOSIS — Z12.31 SCREENING MAMMOGRAM FOR BREAST CANCER: ICD-10-CM

## 2024-08-28 PROCEDURE — 77067 SCR MAMMO BI INCL CAD: CPT

## 2024-08-28 PROCEDURE — 77063 BREAST TOMOSYNTHESIS BI: CPT | Performed by: RADIOLOGY

## 2024-08-28 PROCEDURE — 77067 SCR MAMMO BI INCL CAD: CPT | Performed by: RADIOLOGY

## 2024-08-29 DIAGNOSIS — E11.9 TYPE 2 DIABETES MELLITUS WITHOUT COMPLICATION, WITH LONG-TERM CURRENT USE OF INSULIN (MULTI): ICD-10-CM

## 2024-08-29 DIAGNOSIS — Z79.4 TYPE 2 DIABETES MELLITUS WITHOUT COMPLICATION, WITH LONG-TERM CURRENT USE OF INSULIN (MULTI): ICD-10-CM

## 2024-08-31 RX ORDER — INSULIN ASPART 100 [IU]/ML
15 INJECTION, SOLUTION INTRAVENOUS; SUBCUTANEOUS
Qty: 10 ML | Refills: 3 | Status: SHIPPED | OUTPATIENT
Start: 2024-08-31

## 2024-08-31 RX ORDER — INSULIN GLARGINE 100 [IU]/ML
35 INJECTION, SOLUTION SUBCUTANEOUS 2 TIMES DAILY
Qty: 6 ML | Refills: 3 | Status: SHIPPED | OUTPATIENT
Start: 2024-08-31

## 2024-09-06 DIAGNOSIS — Z79.4 TYPE 2 DIABETES MELLITUS WITHOUT COMPLICATION, WITH LONG-TERM CURRENT USE OF INSULIN (MULTI): ICD-10-CM

## 2024-09-06 DIAGNOSIS — E11.9 TYPE 2 DIABETES MELLITUS WITHOUT COMPLICATION, WITH LONG-TERM CURRENT USE OF INSULIN (MULTI): ICD-10-CM

## 2024-09-06 RX ORDER — INSULIN GLARGINE 100 [IU]/ML
35 INJECTION, SOLUTION SUBCUTANEOUS 2 TIMES DAILY
Qty: 12 ML | Refills: 3 | Status: SHIPPED | OUTPATIENT
Start: 2024-09-06

## 2024-09-17 DIAGNOSIS — Z79.4 TYPE 2 DIABETES MELLITUS WITHOUT COMPLICATION, WITH LONG-TERM CURRENT USE OF INSULIN (MULTI): ICD-10-CM

## 2024-09-17 DIAGNOSIS — E11.9 TYPE 2 DIABETES MELLITUS WITHOUT COMPLICATION, WITH LONG-TERM CURRENT USE OF INSULIN (MULTI): ICD-10-CM

## 2024-09-17 RX ORDER — INSULIN GLARGINE 100 [IU]/ML
35 INJECTION, SOLUTION SUBCUTANEOUS 2 TIMES DAILY
Qty: 60 ML | Refills: 0 | Status: CANCELLED | OUTPATIENT
Start: 2024-09-17

## 2024-10-02 ENCOUNTER — LAB (OUTPATIENT)
Dept: LAB | Facility: LAB | Age: 74
End: 2024-10-02
Payer: MEDICARE

## 2024-10-02 ENCOUNTER — APPOINTMENT (OUTPATIENT)
Dept: PRIMARY CARE | Facility: CLINIC | Age: 74
End: 2024-10-02
Payer: MEDICARE

## 2024-10-02 VITALS
WEIGHT: 293 LBS | HEART RATE: 64 BPM | OXYGEN SATURATION: 98 % | HEIGHT: 69 IN | SYSTOLIC BLOOD PRESSURE: 118 MMHG | DIASTOLIC BLOOD PRESSURE: 68 MMHG | BODY MASS INDEX: 43.4 KG/M2

## 2024-10-02 DIAGNOSIS — E21.3 HYPERPARATHYROIDISM (MULTI): ICD-10-CM

## 2024-10-02 DIAGNOSIS — E11.9 TYPE 2 DIABETES MELLITUS WITHOUT COMPLICATION, WITH LONG-TERM CURRENT USE OF INSULIN (MULTI): ICD-10-CM

## 2024-10-02 DIAGNOSIS — E55.9 VITAMIN D DEFICIENCY: ICD-10-CM

## 2024-10-02 DIAGNOSIS — Z79.4 TYPE 2 DIABETES MELLITUS WITHOUT COMPLICATION, WITH LONG-TERM CURRENT USE OF INSULIN (MULTI): ICD-10-CM

## 2024-10-02 DIAGNOSIS — I10 HYPERTENSION, UNSPECIFIED TYPE: ICD-10-CM

## 2024-10-02 DIAGNOSIS — E78.2 MIXED HYPERLIPIDEMIA: ICD-10-CM

## 2024-10-02 DIAGNOSIS — I25.10 CORONARY ARTERY DISEASE INVOLVING NATIVE CORONARY ARTERY OF NATIVE HEART WITHOUT ANGINA PECTORIS: Primary | ICD-10-CM

## 2024-10-02 LAB
ALBUMIN SERPL BCP-MCNC: 4.6 G/DL (ref 3.4–5)
ALP SERPL-CCNC: 116 U/L (ref 33–136)
ALT SERPL W P-5'-P-CCNC: 11 U/L (ref 7–45)
ANION GAP SERPL CALC-SCNC: 13 MMOL/L (ref 10–20)
AST SERPL W P-5'-P-CCNC: 15 U/L (ref 9–39)
BILIRUB SERPL-MCNC: 0.9 MG/DL (ref 0–1.2)
BUN SERPL-MCNC: 29 MG/DL (ref 6–23)
CA-I BLD-SCNC: 1.1 MMOL/L (ref 1.1–1.33)
CALCIUM SERPL-MCNC: 9 MG/DL (ref 8.6–10.6)
CHLORIDE SERPL-SCNC: 102 MMOL/L (ref 98–107)
CO2 SERPL-SCNC: 33 MMOL/L (ref 21–32)
CREAT SERPL-MCNC: 1.62 MG/DL (ref 0.5–1.05)
EGFRCR SERPLBLD CKD-EPI 2021: 34 ML/MIN/1.73M*2
GLUCOSE SERPL-MCNC: 103 MG/DL (ref 74–99)
POC HEMOGLOBIN A1C: 7.4 % (ref 4.2–6.5)
POTASSIUM SERPL-SCNC: 4.7 MMOL/L (ref 3.5–5.3)
PROT SERPL-MCNC: 7 G/DL (ref 6.4–8.2)
SODIUM SERPL-SCNC: 143 MMOL/L (ref 136–145)

## 2024-10-02 PROCEDURE — 83970 ASSAY OF PARATHORMONE: CPT

## 2024-10-02 PROCEDURE — 3078F DIAST BP <80 MM HG: CPT | Performed by: INTERNAL MEDICINE

## 2024-10-02 PROCEDURE — 83036 HEMOGLOBIN GLYCOSYLATED A1C: CPT | Performed by: INTERNAL MEDICINE

## 2024-10-02 PROCEDURE — 3008F BODY MASS INDEX DOCD: CPT | Performed by: INTERNAL MEDICINE

## 2024-10-02 PROCEDURE — 1036F TOBACCO NON-USER: CPT | Performed by: INTERNAL MEDICINE

## 2024-10-02 PROCEDURE — 1123F ACP DISCUSS/DSCN MKR DOCD: CPT | Performed by: INTERNAL MEDICINE

## 2024-10-02 PROCEDURE — 80053 COMPREHEN METABOLIC PANEL: CPT

## 2024-10-02 PROCEDURE — 1159F MED LIST DOCD IN RCRD: CPT | Performed by: INTERNAL MEDICINE

## 2024-10-02 PROCEDURE — 3074F SYST BP LT 130 MM HG: CPT | Performed by: INTERNAL MEDICINE

## 2024-10-02 PROCEDURE — 82330 ASSAY OF CALCIUM: CPT

## 2024-10-02 PROCEDURE — G2211 COMPLEX E/M VISIT ADD ON: HCPCS | Performed by: INTERNAL MEDICINE

## 2024-10-02 PROCEDURE — 99214 OFFICE O/P EST MOD 30 MIN: CPT | Performed by: INTERNAL MEDICINE

## 2024-10-02 PROCEDURE — 36415 COLL VENOUS BLD VENIPUNCTURE: CPT

## 2024-10-02 NOTE — PROGRESS NOTES
"Subjective   Patient ID: Kasie Peña is a 74 y.o. female who presents for the following  Chronic medical care  MEDICARE WELLNESS 12/6/23    Assessment/Plan   DM2: stable,   A1c 7.4 (12/6/2023) --> 7.8 (5/14/2024) --> 7.4 (10/2/24)  Morning sugars 120s ; having lows after eating and she has to eat more.   Basaglar 35 units bid   Novolog 14 units TID --> 12 units (10/2/24)    CAD triple May 2020:  Asprin crestor metoprolol dr Villagomez    Vitamin d deficiency: stable     Hyperparathyroid s/p parathyroidectomy (3 of 4 nodules removed): stable, asymptomatic at this time  check pth and I calcium     DIASTOLIC CHF: stable euvolemic at this time     CKD stage 3 (cr 1.6-1.9): bmp and urine albumin  Avoid nsaids and dehydration    OA: recommend tylenol as needed and topical biofreeze    Kidney stones: stable patient takes potassium citrate ER 10meq follows with dr carvajal     Morbid obesity: stable, recommend diet and weight loss; discussed     Mammogram done April 2023     Patient defers colon cancer screening at this time but may consider summer 2024     HPI    Diabetes Type 2: patient denies any low blood sugars. Patient is following with opthalmology on a yearly basis. Patient is taking    HTN: patient denies any headaches, blurred vision or dizziness. patient denies any stroke like symptoms    CAD: denies any angina. denies any sob chest pain or pressure especially with exertion. patient follows with cardiology on a regular basis    social: patient denies any smoking, drug or alcohol abuse    family history: DM and heart attack issues in the family          Visit Vitals  Pulse 64   Ht 1.753 m (5' 9\")   Wt 136 kg (300 lb)   SpO2 98%   BMI 44.30 kg/m²   OB Status Postmenopausal   Smoking Status Never   BSA 2.57 m²     PHYSICAL EXAM     General appearance: Alert and in no acute distress. speech is clear and coherent  HEENT: Sclera and conjunctiva white, EOMI,      Respiratory : No respiratory distress, normal respiratory " "rhythm and effort. Clear bilateral breath sounds. No wheezing or rhonchi.   Cardiovascular: heart rate regular, S1, S2. no murmurs. no Lower extremity edema  Skin inspection: Normal skin color and pigmentation, normal skin turgor and no visible rash, induration, or cellulitis  MSK: 5/5 strength upper and lower extremities without gait abnormalities. no loss of muscle mass   Neuro: 2-12 CN grossly intact.  no slurred speech. no lateralizing deficit     REVIEW OF SYSTEMS     Constitutional: not feeling tired and no fever, chills or sweats.  no headache  Cardiovascular: no exertional chest pain, no palpitations, trace lower extremity edema    Lungs: Denies shortness of breath, exertional dyspnea, wheezing  Gastrointestinal: no change in bowel habits, no diarrhea, no nausea, no vomiting and no abdominal pain.    Musculoskeletal: no myalgias, no muscle weakness    Skin: no rashes, no change in skin color and pigmentation and no skin lumps.      Allergies   Allergen Reactions    Macrobid [Nitrofurantoin Monohyd/M-Cryst] Anaphylaxis and Rash       Current Outpatient Medications   Medication Sig Dispense Refill    aspirin 81 mg EC tablet Take 1 tablet (81 mg) by mouth once daily.      BD Ultra-Fine Mini Pen Needle 31 gauge x 3/16\" needle USE THREE TIMES DAILY FOR DM CONTROL AND MONITORING      blood sugar diagnostic strip Test blood sugars two to three times daily. 200 each 3    ergocalciferol (Vitamin D-2) 1.25 MG (75359 UT) capsule Take 1 capsule (1,250 mcg) by mouth 1 (one) time per week. 13 capsule 3    insulin aspart (NovoLOG Flexpen U-100 Insulin) 100 unit/mL (3 mL) pen Inject 15 Units under the skin 3 times daily (morning, midday, late afternoon). 10 mL 3    insulin glargine (Basaglar KwikPen U-100 Insulin) 100 unit/mL (3 mL) pen Inject 35 Units under the skin 2 times a day. 12 mL 3    metoprolol succinate XL (Toprol-XL) 25 mg 24 hr tablet TAKE 1 TABLET BY MOUTH EVERY DAY 90 tablet 3    potassium citrate CR " (Urocit-K-10) 10 mEq ER tablet Take 1 tablet (10 mEq) by mouth once daily.      rosuvastatin (Crestor) 20 mg tablet TAKE 1 TABLET BY MOUTH EVERY DAY 90 tablet 3    torsemide (Demadex) 20 mg tablet Take 1 tablet (20 mg) by mouth 2 times a day. 180 tablet 3     No current facility-administered medications for this visit.       Objective     No visits with results within 4 Month(s) from this visit.   Latest known visit with results is:   Office Visit on 05/14/2024   Component Date Value Ref Range Status    POC HEMOGLOBIN A1c 05/14/2024 7.8 (A)  4.2 - 6.5 % Final       Radiology: Reviewed imaging in powerchart.  No results found.    No family history on file.  Social History     Socioeconomic History    Marital status:    Tobacco Use    Smoking status: Never    Smokeless tobacco: Never   Substance and Sexual Activity    Alcohol use: Never    Drug use: Never     Past Medical History:   Diagnosis Date    Essential (primary) hypertension     Benign essential HTN    Old myocardial infarction     History of myocardial infarction    Personal history of other diseases of the circulatory system 02/15/2018    History of coronary artery disease    Personal history of other endocrine, nutritional and metabolic disease 06/15/2018    History of hyperlipidemia    Personal history of other endocrine, nutritional and metabolic disease     History of hypercholesterolemia     Past Surgical History:   Procedure Laterality Date    APPENDECTOMY  03/19/2014    Appendectomy    CHOLECYSTECTOMY  03/19/2014    Cholecystectomy    OOPHORECTOMY  03/19/2014    Oophorectomy    OTHER SURGICAL HISTORY  06/04/2020    Coronary artery bypass graft    OTHER SURGICAL HISTORY  06/28/2016    Cath Stent Placement Number Of Stents Placed:    TONSILLECTOMY  03/19/2014    Tonsillectomy       Charting was completed using voice recognition technology and may include unintended errors.

## 2024-10-03 LAB — PTH-INTACT SERPL-MCNC: 118.9 PG/ML (ref 18.5–88)

## 2024-11-07 ENCOUNTER — APPOINTMENT (OUTPATIENT)
Dept: CARDIOLOGY | Facility: CLINIC | Age: 74
End: 2024-11-07
Payer: MEDICARE

## 2024-11-07 VITALS
BODY MASS INDEX: 43.4 KG/M2 | DIASTOLIC BLOOD PRESSURE: 72 MMHG | WEIGHT: 293 LBS | SYSTOLIC BLOOD PRESSURE: 130 MMHG | HEIGHT: 69 IN | OXYGEN SATURATION: 96 % | HEART RATE: 70 BPM

## 2024-11-07 DIAGNOSIS — Z95.5 HISTORY OF CORONARY ARTERY STENT PLACEMENT: ICD-10-CM

## 2024-11-07 DIAGNOSIS — I25.10 CORONARY ARTERY DISEASE INVOLVING NATIVE CORONARY ARTERY OF NATIVE HEART WITHOUT ANGINA PECTORIS: ICD-10-CM

## 2024-11-07 DIAGNOSIS — R06.09 EXERTIONAL DYSPNEA: ICD-10-CM

## 2024-11-07 DIAGNOSIS — E78.2 MIXED HYPERLIPIDEMIA: ICD-10-CM

## 2024-11-07 DIAGNOSIS — I50.32 CHRONIC DIASTOLIC CONGESTIVE HEART FAILURE: ICD-10-CM

## 2024-11-07 DIAGNOSIS — Z95.1 HISTORY OF CORONARY ARTERY BYPASS GRAFT X 3: ICD-10-CM

## 2024-11-07 DIAGNOSIS — I10 PRIMARY HYPERTENSION: Primary | ICD-10-CM

## 2024-11-07 DIAGNOSIS — R60.0 LEG EDEMA: ICD-10-CM

## 2024-11-07 PROCEDURE — 1123F ACP DISCUSS/DSCN MKR DOCD: CPT | Performed by: INTERNAL MEDICINE

## 2024-11-07 PROCEDURE — 3078F DIAST BP <80 MM HG: CPT | Performed by: INTERNAL MEDICINE

## 2024-11-07 PROCEDURE — 3008F BODY MASS INDEX DOCD: CPT | Performed by: INTERNAL MEDICINE

## 2024-11-07 PROCEDURE — 99214 OFFICE O/P EST MOD 30 MIN: CPT | Performed by: INTERNAL MEDICINE

## 2024-11-07 PROCEDURE — 1159F MED LIST DOCD IN RCRD: CPT | Performed by: INTERNAL MEDICINE

## 2024-11-07 PROCEDURE — 1036F TOBACCO NON-USER: CPT | Performed by: INTERNAL MEDICINE

## 2024-11-07 PROCEDURE — 3075F SYST BP GE 130 - 139MM HG: CPT | Performed by: INTERNAL MEDICINE

## 2024-11-07 NOTE — PROGRESS NOTES
Subjective   Kasie Peña is a 74 y.o. female.    Chief Complaint:  Follow-up coronary disease.  Exertional dyspnea.    HPI    She is status post coronary bypass grafting in 2020.  She presented with typical symptoms of unstable angina.  Since her coronary bypass grafting procedure she has not had further episodes of angina.  Has some postoperative chest pains.  She also complains of some weakness in her lower extremities which she feels started after her bypass surgery.  No other significant cardiac issues going on.    She presented to [facility] in 2020 with symptoms of unstable angina. Cardiac catheterization demonstrated severe three-vessel coronary artery disease. She had a 50% ostial stenosis of the left main coronary artery. The left anterior descending had a 60% stenosis. The circumflex had multiple lesions. The right coronary artery had a 50% ostial stenosis.     On May 4, 2020 she underwent coronary artery bypass grafting with a left internal mammary graft to the anterior descending, vein graft to the circumflex, a vein graft to the posterior descending branch of the right coronary artery.     Her past medical history is significant for hypertension, hyperlipidemia and diabetes. She does have a family history of heart disease.     In the past, she smoked two packs per day (but quit many years ago).     She has had a cholecystectomy and appendectomy.     Allergies  Medication    · Biaxin   Rash; Updated By: Adela Kendrick; 2021 3:33:54 PM     Family History  Mother    · Family history of coronary artery disease (V17.3) (Z82.49)   · Family history of diabetes mellitus (V18.0) (Z83.3)  Father    · Family history of myocardial infarction (V17.3) (Z82.49)   ·  age 67 from MI  Social History  Problems    · Former smoker (V15.82) (Z87.891)   ·    · Minimum alcohol consumption     Review of Systems   Constitutional: Positive for malaise/fatigue.   Cardiovascular:   "Positive for dyspnea on exertion.   Musculoskeletal:  Positive for arthritis.   All other systems reviewed and are negative.    Current Outpatient Medications   Medication Sig Dispense Refill    aspirin 81 mg EC tablet Take 1 tablet (81 mg) by mouth once daily.      BD Ultra-Fine Mini Pen Needle 31 gauge x 3/16\" needle USE THREE TIMES DAILY FOR DM CONTROL AND MONITORING      blood sugar diagnostic strip Test blood sugars two to three times daily. 200 each 3    ergocalciferol (Vitamin D-2) 1.25 MG (95019 UT) capsule Take 1 capsule (1,250 mcg) by mouth 1 (one) time per week. 13 capsule 3    insulin aspart (NovoLOG Flexpen U-100 Insulin) 100 unit/mL (3 mL) pen Inject 15 Units under the skin 3 times daily (morning, midday, late afternoon). 10 mL 3    insulin glargine (Basaglar KwikPen U-100 Insulin) 100 unit/mL (3 mL) pen Inject 35 Units under the skin 2 times a day. 12 mL 3    metoprolol succinate XL (Toprol-XL) 25 mg 24 hr tablet TAKE 1 TABLET BY MOUTH EVERY DAY 90 tablet 3    potassium citrate CR (Urocit-K-10) 10 mEq ER tablet Take 1 tablet (10 mEq) by mouth once daily.      rosuvastatin (Crestor) 20 mg tablet TAKE 1 TABLET BY MOUTH EVERY DAY 90 tablet 3    torsemide (Demadex) 20 mg tablet Take 1 tablet (20 mg) by mouth 2 times a day. 180 tablet 3     No current facility-administered medications for this visit.        Visit Vitals  /72 (BP Location: Right arm)   Pulse 70   Ht 1.753 m (5' 9\")   Wt 133 kg (293 lb)   SpO2 96%   BMI 43.27 kg/m²   OB Status Postmenopausal   Smoking Status Never   BSA 2.54 m²        Objective     Constitutional:       Appearance: Not in distress.   Neck:      Vascular: JVD normal.   Pulmonary:      Breath sounds: Normal breath sounds.   Cardiovascular:      Normal rate. Regular rhythm. Normal S1. Normal S2.       Murmurs: There is no murmur.      No gallop.    Pulses:     Intact distal pulses.   Edema:     Peripheral edema absent.   Abdominal:      General: There is no distension.    "   Palpations: Abdomen is soft.   Neurological:      Mental Status: Alert.         Lab Review:   Lab Results   Component Value Date     10/02/2024    K 4.7 10/02/2024     10/02/2024    CO2 33 (H) 10/02/2024    BUN 29 (H) 10/02/2024    CREATININE 1.62 (H) 10/02/2024    GLUCOSE 103 (H) 10/02/2024    CALCIUM 9.0 10/02/2024     Lab Results   Component Value Date    CHOL 148 03/01/2023    TRIG 254 (H) 03/01/2023    HDL 45.8 03/01/2023       Assessment:    1.  Coronary artery disease.  Status post coronary bypass grafting.  Good result after coronary artery bypass grafting.  On single antiplatelet therapy.    2.  Hypertension.  Blood pressures are normal.    3.  Chronic diastolic congestive heart failure.  Oxygen saturation is normal.  Lungs are clear.    4.  Hyperlipidemia.  Cholesterol 127, HDL 44, LDL 61.    5.  Obesity.  Discussed with the patient the use of a semaglutide drug for weight loss.  She is diabetic.  She has been somewhat reluctant to use the medication but we have encouraged her to do so.    6.  Chronic renal failure.  Latest labs show a potassium 4.7, BUN 29, creatinine 1.62.  Renal function remained stable.

## 2024-11-15 DIAGNOSIS — E11.9 TYPE 2 DIABETES MELLITUS WITHOUT COMPLICATION, WITH LONG-TERM CURRENT USE OF INSULIN (MULTI): ICD-10-CM

## 2024-11-15 DIAGNOSIS — Z79.4 TYPE 2 DIABETES MELLITUS WITHOUT COMPLICATION, WITH LONG-TERM CURRENT USE OF INSULIN (MULTI): ICD-10-CM

## 2024-11-15 RX ORDER — PEN NEEDLE, DIABETIC 30 GX3/16"
NEEDLE, DISPOSABLE MISCELLANEOUS
Qty: 300 EACH | Refills: 3 | Status: SHIPPED | OUTPATIENT
Start: 2024-11-15

## 2024-12-09 ENCOUNTER — TELEPHONE (OUTPATIENT)
Dept: CARDIOLOGY | Facility: CLINIC | Age: 74
End: 2024-12-09
Payer: MEDICARE

## 2024-12-09 DIAGNOSIS — R06.09 EXERTIONAL DYSPNEA: Primary | ICD-10-CM

## 2024-12-09 DIAGNOSIS — R60.0 LEG EDEMA: ICD-10-CM

## 2024-12-09 NOTE — TELEPHONE ENCOUNTER
Called pt to discuss. Pt states she has edema since her bypass surgery but recently has increased edema to LLE from knee to ankle. Pt states edema becomes increased approx 1-2 hours after being on her feet. Pt states eventually her left leg becomes hard and hurts when she touches the skin. Pt denies any redness to left leg and it is not a specific area, it encompasses the leg from knee down. Pt states she is miserable and finds it difficult to walk due to the pain.     Pt states she has issues with left knee and is seeing Dr. Smith next week.     Taking Torsemide 20mg daily but doubles dose twice a week.    Please advise. Thanks.

## 2024-12-09 NOTE — TELEPHONE ENCOUNTER
Pt called in stating that since her coronary artery bypass in 2020 she has had issues with edema. Recently her left leg has gottne much worse. She is fine when she wakes up but about an hour after getting up from her knee down it hurts to touch and gets rock hard.

## 2024-12-11 DIAGNOSIS — Z79.4 TYPE 2 DIABETES MELLITUS WITHOUT COMPLICATION, WITH LONG-TERM CURRENT USE OF INSULIN (MULTI): ICD-10-CM

## 2024-12-11 DIAGNOSIS — E11.9 TYPE 2 DIABETES MELLITUS WITHOUT COMPLICATION, WITH LONG-TERM CURRENT USE OF INSULIN (MULTI): ICD-10-CM

## 2024-12-11 RX ORDER — INSULIN ASPART 100 [IU]/ML
15 INJECTION, SOLUTION INTRAVENOUS; SUBCUTANEOUS
Qty: 45 ML | Refills: 2 | Status: SHIPPED | OUTPATIENT
Start: 2024-12-11 | End: 2024-12-11 | Stop reason: SDUPTHER

## 2024-12-12 RX ORDER — INSULIN ASPART 100 [IU]/ML
15 INJECTION, SOLUTION INTRAVENOUS; SUBCUTANEOUS
Qty: 45 ML | Refills: 2 | Status: SHIPPED | OUTPATIENT
Start: 2024-12-12

## 2024-12-16 ENCOUNTER — TELEPHONE (OUTPATIENT)
Dept: CARDIOLOGY | Facility: CLINIC | Age: 74
End: 2024-12-16
Payer: MEDICARE

## 2024-12-16 NOTE — TELEPHONE ENCOUNTER
Patient had called in last week with leg edema.  Dr. Villagomez ordered BNP and D-Dimer. Patient did not complete BNP or D-Dimer per Dr. Villagomez but went to  ER.  Was admitted over the weekend for CHF and diuresed.  Patient is down 8 lbs.  Feeling great.  No longer has edema.  They did not change her diuretic.  She is taking Torsemide 20 mg daily.  We do not have the labs in our system from  but BW from 2 months ago showed BUN/CR 29/1.62.  She is hesitant to increase Torsemide wanting to preserve kidney function.  Patient states she had an Echo while in  and it was ok.  She was told to follow up with Dr. Villagomez but just saw him last month.  She is looking for direction.  Does she need follow up?  Medication adjustment?  Follow up labs? Please advise.

## 2024-12-18 ENCOUNTER — PATIENT OUTREACH (OUTPATIENT)
Dept: CARE COORDINATION | Facility: CLINIC | Age: 74
End: 2024-12-18
Payer: MEDICARE

## 2024-12-18 SDOH — ECONOMIC STABILITY: GENERAL: WOULD YOU LIKE HELP WITH ANY OF THE FOLLOWING NEEDS?: I DONT NEED HELP WITH ANY OF THESE

## 2024-12-18 NOTE — PROGRESS NOTES
Outreach call to patient to support a smooth transition of care from recent admission.  Spoke with patient, she states she is doing well, has all discharge medications and follow up with Cardiology is scheduled.   Will continue to monitor through transition period.

## 2024-12-24 DIAGNOSIS — E11.9 TYPE 2 DIABETES MELLITUS WITHOUT COMPLICATION, WITHOUT LONG-TERM CURRENT USE OF INSULIN (MULTI): ICD-10-CM

## 2024-12-24 RX ORDER — BLOOD-GLUCOSE METER
EACH MISCELLANEOUS
Qty: 300 STRIP | Refills: 3 | Status: SHIPPED | OUTPATIENT
Start: 2024-12-24

## 2024-12-27 PROBLEM — E78.2 MIXED HYPERLIPIDEMIA: Status: ACTIVE | Noted: 2023-11-04

## 2024-12-27 PROBLEM — M18.11 PRIMARY OSTEOARTHRITIS OF FIRST CARPOMETACARPAL JOINT OF RIGHT HAND: Status: ACTIVE | Noted: 2024-10-17

## 2024-12-29 NOTE — PROGRESS NOTES
Subjective   Kasie Peña is a 74 y.o. female.    Chief Complaint:  Hospital follow-up for shortness of breath.    HPI    She presented to [facility] on 2024 with shortness of breath and dyspnea.  She had a mildly elevated BNP.  Diagnosed with congestive heart failure.  Seen by cardiology service.  Had an echo study done.  Then discharged without changes in medications.  1 week later she had a hospitalization for severe vertigo.  This has improved although she still has some symptoms when she looks downward.    She presented to [facility] in 2020 with symptoms of unstable angina. Cardiac catheterization demonstrated severe three-vessel coronary artery disease. She had a 50% ostial stenosis of the left main coronary artery. The left anterior descending had a 60% stenosis. The circumflex had multiple lesions. The right coronary artery had a 50% ostial stenosis.     On May 4, 2020 she underwent coronary artery bypass grafting with a left internal mammary graft to the anterior descending, vein graft to the circumflex, a vein graft to the posterior descending branch of the right coronary artery.     Her past medical history is significant for hypertension, hyperlipidemia and diabetes. She does have a family history of heart disease.     In the past, she smoked two packs per day (but quit many years ago).     She has had a cholecystectomy and appendectomy.     Allergies  Medication    · Biaxin     Family History  Mother    · Family history of coronary artery disease (V17.3) (Z82.49)   · Family history of diabetes mellitus (V18.0) (Z83.3)  Father    · Family history of myocardial infarction (V17.3) (Z82.49)   ·  age 67 from MI    Social History  Problems    · Former smoker (V15.82) (Z87.891)   ·    · Minimum alcohol consumption     Review of Systems   Constitutional: Positive for malaise/fatigue.   Cardiovascular:  Positive for dyspnea on exertion.  "  Musculoskeletal:  Positive for arthritis.   All other systems reviewed and are negative.      Current Outpatient Medications   Medication Sig Dispense Refill    aspirin 81 mg EC tablet Take 1 tablet (81 mg) by mouth once daily.      blood sugar diagnostic (OneTouch Verio test strips) strip TEST BLOOD SUGARS TWO TO THREE TIMES DAILY. 300 strip 3    ergocalciferol (Vitamin D-2) 1.25 MG (51035 UT) capsule Take 1 capsule (1,250 mcg) by mouth 1 (one) time per week. 13 capsule 3    insulin aspart (NovoLOG) 100 unit/mL (3 mL) pen Inject 15 Units under the skin 3 times daily (morning, midday, late afternoon). 45 mL 2    insulin glargine (Basaglar KwikPen U-100 Insulin) 100 unit/mL (3 mL) pen Inject 35 Units under the skin 2 times a day. 12 mL 3    metoprolol succinate XL (Toprol-XL) 25 mg 24 hr tablet TAKE 1 TABLET BY MOUTH EVERY DAY 90 tablet 3    pen needle, diabetic (BD Ultra-Fine Mini Pen Needle) 31 gauge x 3/16\" needle INJECT INSULIN 3 TIMES DAILY 300 each 3    potassium citrate CR (Urocit-K-10) 10 mEq ER tablet Take 1 tablet (10 mEq) by mouth once daily.      rosuvastatin (Crestor) 20 mg tablet TAKE 1 TABLET BY MOUTH EVERY DAY 90 tablet 3    meclizine (Antivert) 25 mg tablet 1 tablet (25 mg). (Patient not taking: Reported on 12/30/2024)      torsemide (Demadex) 20 mg tablet Take 1 tablet (20 mg) by mouth 2 times a day. 180 tablet 3     No current facility-administered medications for this visit.        Visit Vitals  /76 (BP Location: Left arm)   Pulse 76   Ht 1.753 m (5' 9\")   Wt 134 kg (295 lb)   SpO2 97%   BMI 43.56 kg/m²   OB Status Postmenopausal   Smoking Status Never   BSA 2.55 m²        Objective     Constitutional:       Appearance: Not in distress.   Neck:      Vascular: JVD normal.   Pulmonary:      Breath sounds: Normal breath sounds.   Cardiovascular:      Normal rate. Regular rhythm. Normal S1. Normal S2.       Murmurs: There is no murmur.      No gallop.    Pulses:     Intact distal pulses. "   Edema:     Peripheral edema absent.   Abdominal:      General: There is no distension.      Palpations: Abdomen is soft.   Neurological:      Mental Status: Alert.         Lab Review:   Lab Results   Component Value Date     10/02/2024    K 4.7 10/02/2024     10/02/2024    CO2 33 (H) 10/02/2024    BUN 29 (H) 10/02/2024    CREATININE 1.62 (H) 10/02/2024    GLUCOSE 103 (H) 10/02/2024    CALCIUM 9.0 10/02/2024     Lab Results   Component Value Date    CHOL 148 03/01/2023    TRIG 254 (H) 03/01/2023    HDL 45.8 03/01/2023       Assessment:    1.  Coronary disease.  Status post coronary artery bypass grafting in 2020.  No anginal symptoms.  Will continue treatment with single antiplatelet therapy.    2.  Chronic diastolic congestive heart failure.  By exam she does not have heart failure.  Only mildly elevated BNP.  Do not feel that we need to make adjustments in her medications.    3.  Vertigo.  Have recommended physical therapy.    4.  Hyperlipidemia.  Cholesterol is 127 with LDL 61.    5.  Chronic renal insufficiency.  Creatinine is around 1.6.  This has been stable.    Patient's latest echocardiographic study done on December 15 demonstrated normal left ventricular function with an ejection fraction of 60%.

## 2024-12-30 ENCOUNTER — OFFICE VISIT (OUTPATIENT)
Dept: CARDIOLOGY | Facility: CLINIC | Age: 74
End: 2024-12-30
Payer: MEDICARE

## 2024-12-30 VITALS
HEIGHT: 69 IN | DIASTOLIC BLOOD PRESSURE: 76 MMHG | BODY MASS INDEX: 43.4 KG/M2 | OXYGEN SATURATION: 97 % | HEART RATE: 76 BPM | SYSTOLIC BLOOD PRESSURE: 124 MMHG | WEIGHT: 293 LBS

## 2024-12-30 DIAGNOSIS — E78.2 MIXED HYPERLIPIDEMIA: ICD-10-CM

## 2024-12-30 DIAGNOSIS — Z95.5 HISTORY OF CORONARY ARTERY STENT PLACEMENT: ICD-10-CM

## 2024-12-30 DIAGNOSIS — I25.10 CORONARY ARTERY DISEASE INVOLVING NATIVE CORONARY ARTERY OF NATIVE HEART WITHOUT ANGINA PECTORIS: Primary | ICD-10-CM

## 2024-12-30 DIAGNOSIS — I50.32 CHRONIC DIASTOLIC CONGESTIVE HEART FAILURE: ICD-10-CM

## 2024-12-30 DIAGNOSIS — Z95.1 HISTORY OF CORONARY ARTERY BYPASS GRAFT X 3: ICD-10-CM

## 2024-12-30 DIAGNOSIS — I10 PRIMARY HYPERTENSION: ICD-10-CM

## 2024-12-30 DIAGNOSIS — R06.09 EXERTIONAL DYSPNEA: ICD-10-CM

## 2024-12-30 PROCEDURE — 3074F SYST BP LT 130 MM HG: CPT | Performed by: INTERNAL MEDICINE

## 2024-12-30 PROCEDURE — 99214 OFFICE O/P EST MOD 30 MIN: CPT | Performed by: INTERNAL MEDICINE

## 2024-12-30 PROCEDURE — 1036F TOBACCO NON-USER: CPT | Performed by: INTERNAL MEDICINE

## 2024-12-30 PROCEDURE — 3008F BODY MASS INDEX DOCD: CPT | Performed by: INTERNAL MEDICINE

## 2024-12-30 PROCEDURE — 3078F DIAST BP <80 MM HG: CPT | Performed by: INTERNAL MEDICINE

## 2024-12-30 PROCEDURE — 1123F ACP DISCUSS/DSCN MKR DOCD: CPT | Performed by: INTERNAL MEDICINE

## 2024-12-30 PROCEDURE — 1159F MED LIST DOCD IN RCRD: CPT | Performed by: INTERNAL MEDICINE

## 2024-12-30 RX ORDER — METHYLPREDNISOLONE 4 MG/1
TABLET ORAL
COMMUNITY
Start: 2024-12-24 | End: 2024-12-30 | Stop reason: ALTCHOICE

## 2024-12-30 RX ORDER — MECLIZINE HYDROCHLORIDE 25 MG/1
25 TABLET ORAL
COMMUNITY
Start: 2024-12-24 | End: 2024-12-31

## 2024-12-30 NOTE — PATIENT INSTRUCTIONS
We feel that you are ok on your current medications.    We would recommend physical therapy for your vertigo.

## 2025-01-02 ENCOUNTER — PATIENT OUTREACH (OUTPATIENT)
Dept: CARE COORDINATION | Facility: CLINIC | Age: 75
End: 2025-01-02
Payer: MEDICARE

## 2025-01-16 DIAGNOSIS — I50.32 CHRONIC DIASTOLIC CONGESTIVE HEART FAILURE: Primary | ICD-10-CM

## 2025-01-16 RX ORDER — TORSEMIDE 20 MG/1
20 TABLET ORAL 2 TIMES DAILY
Qty: 180 TABLET | Refills: 3 | Status: SHIPPED | OUTPATIENT
Start: 2025-01-16

## 2025-01-24 ENCOUNTER — PATIENT OUTREACH (OUTPATIENT)
Dept: CARE COORDINATION | Facility: CLINIC | Age: 75
End: 2025-01-24
Payer: MEDICARE

## 2025-02-05 ENCOUNTER — APPOINTMENT (OUTPATIENT)
Dept: PRIMARY CARE | Facility: CLINIC | Age: 75
End: 2025-02-05
Payer: MEDICARE

## 2025-03-10 ENCOUNTER — APPOINTMENT (OUTPATIENT)
Dept: PRIMARY CARE | Facility: CLINIC | Age: 75
End: 2025-03-10
Payer: MEDICARE

## 2025-03-10 VITALS
HEIGHT: 69 IN | BODY MASS INDEX: 42.95 KG/M2 | OXYGEN SATURATION: 93 % | SYSTOLIC BLOOD PRESSURE: 147 MMHG | DIASTOLIC BLOOD PRESSURE: 77 MMHG | WEIGHT: 290 LBS | HEART RATE: 63 BPM

## 2025-03-10 DIAGNOSIS — N18.32 CHRONIC KIDNEY DISEASE, STAGE 3B (MULTI): ICD-10-CM

## 2025-03-10 DIAGNOSIS — E55.9 VITAMIN D DEFICIENCY: ICD-10-CM

## 2025-03-10 DIAGNOSIS — E11.9 TYPE 2 DIABETES MELLITUS WITHOUT COMPLICATION, WITH LONG-TERM CURRENT USE OF INSULIN (MULTI): ICD-10-CM

## 2025-03-10 DIAGNOSIS — Z00.00 WELLNESS EXAMINATION: Primary | ICD-10-CM

## 2025-03-10 DIAGNOSIS — E11.3593 TYPE 2 DIABETES MELLITUS WITH BOTH EYES AFFECTED BY PROLIFERATIVE RETINOPATHY WITHOUT MACULAR EDEMA, WITH LONG-TERM CURRENT USE OF INSULIN: ICD-10-CM

## 2025-03-10 DIAGNOSIS — Z79.4 TYPE 2 DIABETES MELLITUS WITHOUT COMPLICATION, WITH LONG-TERM CURRENT USE OF INSULIN (MULTI): ICD-10-CM

## 2025-03-10 DIAGNOSIS — M17.12 PRIMARY OSTEOARTHRITIS OF LEFT KNEE: ICD-10-CM

## 2025-03-10 DIAGNOSIS — Z79.4 TYPE 2 DIABETES MELLITUS WITH BOTH EYES AFFECTED BY PROLIFERATIVE RETINOPATHY WITHOUT MACULAR EDEMA, WITH LONG-TERM CURRENT USE OF INSULIN: ICD-10-CM

## 2025-03-10 DIAGNOSIS — E78.2 MIXED HYPERLIPIDEMIA: ICD-10-CM

## 2025-03-10 DIAGNOSIS — I50.32 CHRONIC DIASTOLIC CONGESTIVE HEART FAILURE: ICD-10-CM

## 2025-03-10 DIAGNOSIS — I10 PRIMARY HYPERTENSION: ICD-10-CM

## 2025-03-10 LAB — POC HEMOGLOBIN A1C: 7.6 % (ref 4.2–6.5)

## 2025-03-10 PROCEDURE — 1159F MED LIST DOCD IN RCRD: CPT | Performed by: INTERNAL MEDICINE

## 2025-03-10 PROCEDURE — 1170F FXNL STATUS ASSESSED: CPT | Performed by: INTERNAL MEDICINE

## 2025-03-10 PROCEDURE — 3078F DIAST BP <80 MM HG: CPT | Performed by: INTERNAL MEDICINE

## 2025-03-10 PROCEDURE — 1160F RVW MEDS BY RX/DR IN RCRD: CPT | Performed by: INTERNAL MEDICINE

## 2025-03-10 PROCEDURE — 99214 OFFICE O/P EST MOD 30 MIN: CPT | Performed by: INTERNAL MEDICINE

## 2025-03-10 PROCEDURE — 1123F ACP DISCUSS/DSCN MKR DOCD: CPT | Performed by: INTERNAL MEDICINE

## 2025-03-10 PROCEDURE — 83036 HEMOGLOBIN GLYCOSYLATED A1C: CPT | Performed by: INTERNAL MEDICINE

## 2025-03-10 PROCEDURE — 3077F SYST BP >= 140 MM HG: CPT | Performed by: INTERNAL MEDICINE

## 2025-03-10 PROCEDURE — 1036F TOBACCO NON-USER: CPT | Performed by: INTERNAL MEDICINE

## 2025-03-10 PROCEDURE — 3008F BODY MASS INDEX DOCD: CPT | Performed by: INTERNAL MEDICINE

## 2025-03-10 PROCEDURE — G0439 PPPS, SUBSEQ VISIT: HCPCS | Performed by: INTERNAL MEDICINE

## 2025-03-10 RX ORDER — METHYLPREDNISOLONE 4 MG/1
TABLET ORAL
Qty: 21 TABLET | Refills: 0 | Status: SHIPPED | OUTPATIENT
Start: 2025-03-10 | End: 2025-03-16

## 2025-03-10 ASSESSMENT — PATIENT HEALTH QUESTIONNAIRE - PHQ9
SUM OF ALL RESPONSES TO PHQ9 QUESTIONS 1 AND 2: 2
1. LITTLE INTEREST OR PLEASURE IN DOING THINGS: SEVERAL DAYS
SUM OF ALL RESPONSES TO PHQ9 QUESTIONS 1 AND 2: 0
2. FEELING DOWN, DEPRESSED OR HOPELESS: SEVERAL DAYS
2. FEELING DOWN, DEPRESSED OR HOPELESS: NOT AT ALL
1. LITTLE INTEREST OR PLEASURE IN DOING THINGS: NOT AT ALL

## 2025-03-10 ASSESSMENT — ACTIVITIES OF DAILY LIVING (ADL)
GROCERY_SHOPPING: INDEPENDENT
DOING_HOUSEWORK: INDEPENDENT
MANAGING_FINANCES: INDEPENDENT
BATHING: INDEPENDENT
TAKING_MEDICATION: INDEPENDENT
DRESSING: INDEPENDENT

## 2025-03-10 NOTE — PROGRESS NOTES
"Subjective   Patient ID: Kasie Peña is a 74 y.o. female who presents for the following  Chronic medical care and   MEDICARE WELLNESS 3/10/25  Assessment/Plan   DM2: stable,   A1c 7.4 (12/6/2023) --> 7.8 (5/14/2024) --> 7.4 (10/2/24) --> 7.6 (3/10/25)  Morning sugars 120s ; having lows after eating and she has to eat more.   Basaglar 35 units bid   Novolog 14 units TID --> 12 units TID (10/2/24)    CAD triple May 2020:  Asprin crestor metoprolol dr Villagomez    Vitamin d deficiency: stable     Hyperparathyroid s/p parathyroidectomy (3 of 4 nodules removed): stable, asymptomatic at this time  check pth and I calcium     DIASTOLIC CHF: stable euvolemic at this time     CKD stage 3b (cr 1.6-1.9): bmp and urine albumin  Avoid nsaids and dehydration    OA: recommend tylenol as needed and topical biofreeze    Kidney stones: stable patient takes potassium citrate ER 10meq follows with dr carvajal     Morbid obesity: stable, recommend diet and weight loss; discussed     Mammogram done August 2024    Prevnar 20 patient defers     Patient defers colon cancer screening at this time     HPI    Diabetes Type 2: patient denies any low blood sugars. Patient is following with opthalmology on a yearly basis. Patient is taking    HTN: patient denies any headaches, blurred vision or dizziness. patient denies any stroke like symptoms    CAD: denies any angina. denies any sob chest pain or pressure especially with exertion. patient follows with cardiology on a regular basis    social: patient denies any smoking, drug or alcohol abuse    family history: DM and heart attack issues in the family          Visit Vitals  /77 (BP Location: Left arm, Patient Position: Sitting, BP Cuff Size: Adult)   Pulse 63   Ht 1.753 m (5' 9\")   Wt 132 kg (290 lb)   SpO2 93%   BMI 42.83 kg/m²   OB Status Postmenopausal   Smoking Status Never   BSA 2.54 m²     PHYSICAL EXAM     General appearance: Alert and in no acute distress. speech is clear and " coherent  HEENT: Sclera and conjunctiva white, EOMI,      Respiratory : No respiratory distress, normal respiratory rhythm and effort. Clear bilateral breath sounds. No wheezing or rhonchi.   Cardiovascular: heart rate regular, S1, S2. no murmurs. no Lower extremity edema  Skin inspection: Normal skin color and pigmentation, normal skin turgor and no visible rash, induration, or cellulitis  MSK: 5/5 strength upper and lower extremities without gait abnormalities. no loss of muscle mass   Neuro: 2-12 CN grossly intact.  no slurred speech. no lateralizing deficit     REVIEW OF SYSTEMS     Constitutional: not feeling tired and no fever, chills or sweats.  no headache  Cardiovascular: no exertional chest pain, no palpitations, trace lower extremity edema    Lungs: Denies shortness of breath, exertional dyspnea, wheezing  Gastrointestinal: no change in bowel habits, no diarrhea, no nausea, no vomiting and no abdominal pain.    Musculoskeletal: no myalgias, no muscle weakness    Skin: no rashes, no change in skin color and pigmentation and no skin lumps.      Allergies   Allergen Reactions    Macrobid [Nitrofurantoin Monohyd/M-Cryst] Anaphylaxis and Rash       Current Outpatient Medications   Medication Sig Dispense Refill    aspirin 81 mg EC tablet Take 1 tablet (81 mg) by mouth once daily.      blood sugar diagnostic (OneTouch Verio test strips) strip TEST BLOOD SUGARS TWO TO THREE TIMES DAILY. 300 strip 3    ergocalciferol (Vitamin D-2) 1.25 MG (43222 UT) capsule Take 1 capsule (1,250 mcg) by mouth 1 (one) time per week. 13 capsule 3    insulin aspart (NovoLOG) 100 unit/mL (3 mL) pen Inject 15 Units under the skin 3 times daily (morning, midday, late afternoon). 45 mL 2    insulin glargine (Basaglar KwikPen U-100 Insulin) 100 unit/mL (3 mL) pen Inject 35 Units under the skin 2 times a day. 12 mL 3    metoprolol succinate XL (Toprol-XL) 25 mg 24 hr tablet TAKE 1 TABLET BY MOUTH EVERY DAY 90 tablet 3    pen needle,  "diabetic (BD Ultra-Fine Mini Pen Needle) 31 gauge x 3/16\" needle INJECT INSULIN 3 TIMES DAILY 300 each 3    potassium citrate CR (Urocit-K-10) 10 mEq ER tablet Take 1 tablet (10 mEq) by mouth once daily.      rosuvastatin (Crestor) 20 mg tablet TAKE 1 TABLET BY MOUTH EVERY DAY 90 tablet 3    torsemide (Demadex) 20 mg tablet TAKE 1 TABLET BY MOUTH TWICE A  tablet 3     No current facility-administered medications for this visit.       Objective     Office Visit on 03/10/2025   Component Date Value Ref Range Status    POC HEMOGLOBIN A1c 03/10/2025 7.6 (A)  4.2 - 6.5 % Final       Radiology: Reviewed imaging in powerchart.  No results found.    No family history on file.  Social History     Socioeconomic History    Marital status:    Tobacco Use    Smoking status: Never    Smokeless tobacco: Never   Substance and Sexual Activity    Alcohol use: Never    Drug use: Never     Past Medical History:   Diagnosis Date    Essential (primary) hypertension     Benign essential HTN    Old myocardial infarction     History of myocardial infarction    Personal history of other diseases of the circulatory system 02/15/2018    History of coronary artery disease    Personal history of other endocrine, nutritional and metabolic disease 06/15/2018    History of hyperlipidemia    Personal history of other endocrine, nutritional and metabolic disease     History of hypercholesterolemia     Past Surgical History:   Procedure Laterality Date    APPENDECTOMY  03/19/2014    Appendectomy    CHOLECYSTECTOMY  03/19/2014    Cholecystectomy    OOPHORECTOMY  03/19/2014    Oophorectomy    OTHER SURGICAL HISTORY  06/04/2020    Coronary artery bypass graft    OTHER SURGICAL HISTORY  06/28/2016    Cath Stent Placement Number Of Stents Placed:    TONSILLECTOMY  03/19/2014    Tonsillectomy       Charting was completed using voice recognition technology and may include unintended errors.       "

## 2025-03-11 LAB
25(OH)D3+25(OH)D2 SERPL-MCNC: 76 NG/ML (ref 30–100)
ANION GAP SERPL CALCULATED.4IONS-SCNC: 14 MMOL/L (CALC) (ref 7–17)
BUN SERPL-MCNC: 32 MG/DL (ref 7–25)
BUN/CREAT SERPL: 22 (CALC) (ref 6–22)
CA-I SERPL-MCNC: 4.4 MG/DL (ref 4.7–5.5)
CALCIUM SERPL-MCNC: 8.3 MG/DL (ref 8.6–10.4)
CHLORIDE SERPL-SCNC: 103 MMOL/L (ref 98–110)
CK SERPL-CCNC: 104 U/L (ref 18–225)
CO2 SERPL-SCNC: 28 MMOL/L (ref 20–32)
CREAT SERPL-MCNC: 1.45 MG/DL (ref 0.6–1)
EGFRCR SERPLBLD CKD-EPI 2021: 38 ML/MIN/1.73M2
ERYTHROCYTE [DISTWIDTH] IN BLOOD BY AUTOMATED COUNT: 14.2 % (ref 11–15)
GLUCOSE SERPL-MCNC: 85 MG/DL (ref 65–139)
HCT VFR BLD AUTO: 36.8 % (ref 35–45)
HGB BLD-MCNC: 12.1 G/DL (ref 11.7–15.5)
MCH RBC QN AUTO: 30.8 PG (ref 27–33)
MCHC RBC AUTO-ENTMCNC: 32.9 G/DL (ref 32–36)
MCV RBC AUTO: 93.6 FL (ref 80–100)
PLATELET # BLD AUTO: 170 THOUSAND/UL (ref 140–400)
PMV BLD REES-ECKER: 12 FL (ref 7.5–12.5)
POTASSIUM SERPL-SCNC: 4.1 MMOL/L (ref 3.5–5.3)
PTH-INTACT SERPL-MCNC: 129 PG/ML (ref 16–77)
RBC # BLD AUTO: 3.93 MILLION/UL (ref 3.8–5.1)
SODIUM SERPL-SCNC: 145 MMOL/L (ref 135–146)
VIT B12 SERPL-MCNC: 409 PG/ML (ref 200–1100)
WBC # BLD AUTO: 7.6 THOUSAND/UL (ref 3.8–10.8)

## 2025-03-13 DIAGNOSIS — E83.51 HYPOCALCEMIA: Primary | ICD-10-CM

## 2025-03-14 ENCOUNTER — TELEPHONE (OUTPATIENT)
Dept: PRIMARY CARE | Facility: CLINIC | Age: 75
End: 2025-03-14
Payer: MEDICARE

## 2025-03-14 NOTE — TELEPHONE ENCOUNTER
----- Message from Francisco Mcconnell sent at 3/13/2025  6:58 AM EDT -----  Calcium is in the low side, recommend taking OTC calcium supplement for a week and repeat calcium in 2 weeks.

## 2025-03-21 DIAGNOSIS — R07.89 CHEST PAIN, ATYPICAL: ICD-10-CM

## 2025-03-21 DIAGNOSIS — M19.90 ARTHRITIS: ICD-10-CM

## 2025-04-01 LAB — CA-I SERPL-MCNC: 4.9 MG/DL (ref 4.7–5.5)

## 2025-04-19 DIAGNOSIS — R07.89 CHEST PAIN, ATYPICAL: Primary | ICD-10-CM

## 2025-04-21 PROBLEM — M17.12 ARTHRITIS OF LEFT KNEE: Status: ACTIVE | Noted: 2025-03-20

## 2025-04-21 PROBLEM — H81.399 PERIPHERAL POSITIONAL VERTIGO: Status: ACTIVE | Noted: 2024-12-23

## 2025-04-21 PROBLEM — M17.11 ARTHRITIS OF RIGHT KNEE: Status: ACTIVE | Noted: 2025-03-20

## 2025-04-21 RX ORDER — METOPROLOL SUCCINATE 25 MG/1
25 TABLET, EXTENDED RELEASE ORAL DAILY
Qty: 90 TABLET | Refills: 3 | Status: SHIPPED | OUTPATIENT
Start: 2025-04-21

## 2025-05-07 ENCOUNTER — OFFICE VISIT (OUTPATIENT)
Dept: CARDIOLOGY | Facility: CLINIC | Age: 75
End: 2025-05-07
Payer: MEDICARE

## 2025-05-07 VITALS
SYSTOLIC BLOOD PRESSURE: 120 MMHG | HEIGHT: 69 IN | DIASTOLIC BLOOD PRESSURE: 80 MMHG | HEART RATE: 74 BPM | BODY MASS INDEX: 43.4 KG/M2 | WEIGHT: 293 LBS | OXYGEN SATURATION: 98 %

## 2025-05-07 DIAGNOSIS — I50.32 CHRONIC DIASTOLIC CONGESTIVE HEART FAILURE: ICD-10-CM

## 2025-05-07 DIAGNOSIS — E78.2 MIXED HYPERLIPIDEMIA: ICD-10-CM

## 2025-05-07 DIAGNOSIS — Z95.5 HISTORY OF CORONARY ARTERY STENT PLACEMENT: ICD-10-CM

## 2025-05-07 DIAGNOSIS — I10 PRIMARY HYPERTENSION: Primary | ICD-10-CM

## 2025-05-07 DIAGNOSIS — I25.10 CORONARY ARTERY DISEASE INVOLVING NATIVE CORONARY ARTERY OF NATIVE HEART WITHOUT ANGINA PECTORIS: ICD-10-CM

## 2025-05-07 DIAGNOSIS — R07.89 CHEST PAIN, ATYPICAL: ICD-10-CM

## 2025-05-07 DIAGNOSIS — Z95.1 HISTORY OF CORONARY ARTERY BYPASS GRAFT X 3: ICD-10-CM

## 2025-05-07 DIAGNOSIS — R06.09 EXERTIONAL DYSPNEA: ICD-10-CM

## 2025-05-07 LAB
ATRIAL RATE: 67 BPM
P AXIS: 23 DEGREES
P OFFSET: 153 MS
P ONSET: 111 MS
PR INTERVAL: 154 MS
Q ONSET: 188 MS
QRS COUNT: 12 BEATS
QRS DURATION: 146 MS
QT INTERVAL: 488 MS
QTC CALCULATION(BAZETT): 515 MS
QTC FREDERICIA: 506 MS
R AXIS: -56 DEGREES
T AXIS: 113 DEGREES
T OFFSET: 432 MS
VENTRICULAR RATE: 67 BPM

## 2025-05-07 PROCEDURE — 1159F MED LIST DOCD IN RCRD: CPT | Performed by: INTERNAL MEDICINE

## 2025-05-07 PROCEDURE — 3079F DIAST BP 80-89 MM HG: CPT | Performed by: INTERNAL MEDICINE

## 2025-05-07 PROCEDURE — 99213 OFFICE O/P EST LOW 20 MIN: CPT | Performed by: INTERNAL MEDICINE

## 2025-05-07 PROCEDURE — 93005 ELECTROCARDIOGRAM TRACING: CPT | Performed by: INTERNAL MEDICINE

## 2025-05-07 PROCEDURE — 99212 OFFICE O/P EST SF 10 MIN: CPT | Performed by: INTERNAL MEDICINE

## 2025-05-07 PROCEDURE — 3008F BODY MASS INDEX DOCD: CPT | Performed by: INTERNAL MEDICINE

## 2025-05-07 PROCEDURE — 3074F SYST BP LT 130 MM HG: CPT | Performed by: INTERNAL MEDICINE

## 2025-05-07 PROCEDURE — 3051F HG A1C>EQUAL 7.0%<8.0%: CPT | Performed by: INTERNAL MEDICINE

## 2025-05-07 RX ORDER — ROSUVASTATIN CALCIUM 20 MG/1
20 TABLET, COATED ORAL DAILY
Qty: 90 TABLET | Refills: 3 | Status: SHIPPED | OUTPATIENT
Start: 2025-05-07

## 2025-05-07 RX ORDER — TORSEMIDE 20 MG/1
20 TABLET ORAL 2 TIMES DAILY
Qty: 180 TABLET | Refills: 3 | Status: SHIPPED | OUTPATIENT
Start: 2025-05-07

## 2025-05-07 RX ORDER — METOPROLOL SUCCINATE 25 MG/1
25 TABLET, EXTENDED RELEASE ORAL DAILY
Qty: 90 TABLET | Refills: 3 | Status: SHIPPED | OUTPATIENT
Start: 2025-05-07

## 2025-05-07 NOTE — PROGRESS NOTES
Subjective   Kasie Peña is a 74 y.o. female.    Chief Complaint:  Follow-up coronary artery disease.  Follow-up hypertension.  Exertional dyspnea.    HPI    From a cardiac standpoint she has done well.  No anginal symptoms.  Continues to experience some dyspnea with exertion.  Has some noncardiac issues including knee pain.  Also some hip discomfort.  Has had no further episodes of vertigo.    She presented to [facility] in 2020 with symptoms of unstable angina. Cardiac catheterization demonstrated severe three-vessel coronary artery disease. She had a 50% ostial stenosis of the left main coronary artery. The left anterior descending had a 60% stenosis. The circumflex had multiple lesions. The right coronary artery had a 50% ostial stenosis.     On May 4, 2020 she underwent coronary artery bypass grafting with a left internal mammary graft to the anterior descending, vein graft to the circumflex, a vein graft to the posterior descending branch of the right coronary artery.     Her past medical history is significant for hypertension, hyperlipidemia and diabetes. She does have a family history of heart disease.     In the past, she smoked two packs per day (but quit many years ago).     She has had a cholecystectomy and appendectomy.     Allergies  Medication    · Biaxin     Family History  Mother    · Family history of coronary artery disease (V17.3) (Z82.49)   · Family history of diabetes mellitus (V18.0) (Z83.3)  Father    · Family history of myocardial infarction (V17.3) (Z82.49)   ·  age 69 from [place]     Social History  Problems    · Former smoker (V15.82) (Z87.891)   ·    · Minimum alcohol consumption     Review of Systems   Constitutional: Positive for malaise/fatigue.   Cardiovascular:  Positive for dyspnea on exertion.   Musculoskeletal:  Positive for arthritis.   All other systems reviewed and are negative.       Current Medications[1]     Visit Vitals  Pulse 74   Ht  "1.753 m (5' 9\")   Wt 135 kg (298 lb)   SpO2 98%   BMI 44.01 kg/m²   OB Status Postmenopausal   Smoking Status Never   BSA 2.56 m²        Objective     Constitutional:       Appearance: Not in distress.   Neck:      Vascular: JVD normal.   Pulmonary:      Breath sounds: Normal breath sounds.   Cardiovascular:      Normal rate. Regular rhythm. Normal S1. Normal S2.       Murmurs: There is no murmur.      No gallop.    Pulses:     Intact distal pulses.   Edema:     Peripheral edema absent.   Abdominal:      General: There is no distension.      Palpations: Abdomen is soft.   Neurological:      Mental Status: Alert.         Lab Review:   Lab Results   Component Value Date     03/10/2025    K 4.1 03/10/2025     03/10/2025    CO2 28 03/10/2025    BUN 32 (H) 03/10/2025    CREATININE 1.45 (H) 03/10/2025    GLUCOSE 85 03/10/2025    CALCIUM 8.3 (L) 03/10/2025     Lab Results   Component Value Date    CHOL 148 03/01/2023    TRIG 254 (H) 03/01/2023    HDL 45.8 03/01/2023       Assessment:    1.  Coronary disease.  Status post coronary bypass grafting 5 years ago.  No anginal symptoms.  Continue medical management.    2.  Hypertension.  Blood pressures are normal today.    3.  Hyperlipidemia.  Cholesterol 127, HDL 47, LDL 61.    4.  Renal insufficiency.  Potassium 4.7, BUN 29, creatinine 1.43.  Discussed the option of using Jardiance or Farxiga.  Does not want to start with these medications particularly after listening to the side effect profile on the television adds for these medications.  We did discuss with her that these drugs are very valuable for preventing progression of renal disease.    5.  Abnormal EKG.  EKG demonstrates a bifascicular block unchanged from prior EKGs.          [1]   Current Outpatient Medications   Medication Sig Dispense Refill    aspirin 81 mg EC tablet Take 1 tablet (81 mg) by mouth once daily.      blood sugar diagnostic (OneTouch Verio test strips) strip TEST BLOOD SUGARS TWO TO " "THREE TIMES DAILY. 300 strip 3    ergocalciferol (Vitamin D-2) 1.25 MG (62150 UT) capsule Take 1 capsule (1,250 mcg) by mouth 1 (one) time per week. 13 capsule 3    insulin aspart (NovoLOG) 100 unit/mL (3 mL) pen Inject 15 Units under the skin 3 times daily (morning, midday, late afternoon). 45 mL 2    insulin glargine (Basaglar KwikPen U-100 Insulin) 100 unit/mL (3 mL) pen Inject 35 Units under the skin 2 times a day. 12 mL 3    metoprolol succinate XL (Toprol-XL) 25 mg 24 hr tablet TAKE 1 TABLET BY MOUTH EVERY DAY 90 tablet 3    pen needle, diabetic (BD Ultra-Fine Mini Pen Needle) 31 gauge x 3/16\" needle INJECT INSULIN 3 TIMES DAILY 300 each 3    potassium citrate CR (Urocit-K-10) 10 mEq ER tablet Take 1 tablet (10 mEq) by mouth once daily.      rosuvastatin (Crestor) 20 mg tablet TAKE 1 TABLET BY MOUTH EVERY DAY 90 tablet 3    torsemide (Demadex) 20 mg tablet TAKE 1 TABLET BY MOUTH TWICE A  tablet 3     No current facility-administered medications for this visit.     "

## 2025-05-18 DIAGNOSIS — E55.9 VITAMIN D DEFICIENCY: ICD-10-CM

## 2025-05-19 RX ORDER — ERGOCALCIFEROL 1.25 MG/1
CAPSULE ORAL
Qty: 13 CAPSULE | Refills: 3 | Status: SHIPPED | OUTPATIENT
Start: 2025-05-25

## 2025-06-20 ENCOUNTER — PATIENT MESSAGE (OUTPATIENT)
Dept: CARDIOLOGY | Facility: CLINIC | Age: 75
End: 2025-06-20
Payer: MEDICARE

## 2025-07-01 ENCOUNTER — OFFICE VISIT (OUTPATIENT)
Dept: CARDIOLOGY | Facility: CLINIC | Age: 75
End: 2025-07-01
Payer: MEDICARE

## 2025-07-01 VITALS
DIASTOLIC BLOOD PRESSURE: 88 MMHG | HEIGHT: 69 IN | BODY MASS INDEX: 43.4 KG/M2 | HEART RATE: 92 BPM | SYSTOLIC BLOOD PRESSURE: 128 MMHG | OXYGEN SATURATION: 94 % | WEIGHT: 293 LBS

## 2025-07-01 DIAGNOSIS — I50.32 CHRONIC DIASTOLIC CONGESTIVE HEART FAILURE: ICD-10-CM

## 2025-07-01 DIAGNOSIS — Z95.1 HISTORY OF CORONARY ARTERY BYPASS GRAFT X 3: ICD-10-CM

## 2025-07-01 DIAGNOSIS — R06.09 EXERTIONAL DYSPNEA: ICD-10-CM

## 2025-07-01 DIAGNOSIS — I50.32 CHRONIC DIASTOLIC CONGESTIVE HEART FAILURE: Primary | ICD-10-CM

## 2025-07-01 DIAGNOSIS — I25.10 CORONARY ARTERY DISEASE INVOLVING NATIVE CORONARY ARTERY OF NATIVE HEART WITHOUT ANGINA PECTORIS: ICD-10-CM

## 2025-07-01 DIAGNOSIS — E78.2 MIXED HYPERLIPIDEMIA: ICD-10-CM

## 2025-07-01 DIAGNOSIS — Z95.5 HISTORY OF CORONARY ARTERY STENT PLACEMENT: ICD-10-CM

## 2025-07-01 DIAGNOSIS — I10 PRIMARY HYPERTENSION: ICD-10-CM

## 2025-07-01 DIAGNOSIS — R60.0 LEG EDEMA: ICD-10-CM

## 2025-07-01 PROCEDURE — 99214 OFFICE O/P EST MOD 30 MIN: CPT | Performed by: INTERNAL MEDICINE

## 2025-07-01 PROCEDURE — 99212 OFFICE O/P EST SF 10 MIN: CPT

## 2025-07-01 PROCEDURE — 3079F DIAST BP 80-89 MM HG: CPT | Performed by: INTERNAL MEDICINE

## 2025-07-01 PROCEDURE — 1159F MED LIST DOCD IN RCRD: CPT | Performed by: INTERNAL MEDICINE

## 2025-07-01 PROCEDURE — 3051F HG A1C>EQUAL 7.0%<8.0%: CPT | Performed by: INTERNAL MEDICINE

## 2025-07-01 PROCEDURE — 3074F SYST BP LT 130 MM HG: CPT | Performed by: INTERNAL MEDICINE

## 2025-07-01 PROCEDURE — 3008F BODY MASS INDEX DOCD: CPT | Performed by: INTERNAL MEDICINE

## 2025-07-01 PROCEDURE — 1036F TOBACCO NON-USER: CPT | Performed by: INTERNAL MEDICINE

## 2025-07-01 RX ORDER — LOTILANER OPHTHALMIC SOLUTION 2.5 MG/ML
SOLUTION/ DROPS OPHTHALMIC
COMMUNITY
Start: 2025-06-25

## 2025-07-01 NOTE — PROGRESS NOTES
Subjective   Kasie Peña is a 74 y.o. female.    Chief Complaint:  Follow-up coronary artery disease.  Follow-up hypertension.  Exertional dyspnea.  Lower extremity edema.    HPI    Over the past few weeks she has noted increasing lower extremity edema to the point where she has difficulty walking because of the swelling.  We have slowly uptitrated her torsemide which has not had much of an impact on the lower extremity edema.  She has exertional dyspnea.  Has had no further episodes of vertigo.  She reports that she started develop problems with her lower extremities after her coronary artery bypass grafting procedure.    She presented to [facility] in 2020 with symptoms of unstable angina. Cardiac catheterization demonstrated severe three-vessel coronary artery disease. She had a 50% ostial stenosis of the left main coronary artery. The left anterior descending had a 60% stenosis. The circumflex had multiple lesions. The right coronary artery had a 50% ostial stenosis.     On May 4, 2020 she underwent coronary artery bypass grafting with a left internal mammary graft to the anterior descending, vein graft to the circumflex, a vein graft to the posterior descending branch of the right coronary artery.     Her past medical history is significant for hypertension, hyperlipidemia and diabetes. She does have a family history of heart disease.     In the past, she smoked two packs per day (but quit many years ago).     She has had a cholecystectomy and appendectomy.     Allergies  Medication    · Biaxin     Family History  Mother    · Family history of coronary artery disease (V17.3) (Z82.49)   · Family history of diabetes mellitus (V18.0) (Z83.3)  Father    · Family history of myocardial infarction (V17.3) (Z82.49)   ·  age 67 from [place]     Social History  Problems    · Former smoker (V15.82) (Z87.891)   ·    · Minimum alcohol consumption     Review of Systems   Constitutional:  "Positive for malaise/fatigue.   Cardiovascular:  Positive for dyspnea on exertion.   Musculoskeletal:  Positive for arthritis.   All other systems reviewed and are negative.       [Current Medications]     [Current Medications]  Current Outpatient Medications   Medication Sig Dispense Refill    aspirin 81 mg EC tablet Take 1 tablet (81 mg) by mouth once daily.      blood sugar diagnostic (OneTouch Verio test strips) strip TEST BLOOD SUGARS TWO TO THREE TIMES DAILY. 300 strip 3    ergocalciferol (Vitamin D-2) 1.25 MG (57824 UT) capsule Take 1 capsule (1,250 mcg) by mouth 1 (one) time per week. 13 capsule 3    insulin aspart (NovoLOG) 100 unit/mL (3 mL) pen Inject 15 Units under the skin 3 times daily (morning, midday, late afternoon). 45 mL 2    insulin glargine (Basaglar KwikPen U-100 Insulin) 100 unit/mL (3 mL) pen Inject 35 Units under the skin 2 times a day. 12 mL 3    metoprolol succinate XL (Toprol-XL) 25 mg 24 hr tablet TAKE 1 TABLET BY MOUTH EVERY DAY 90 tablet 3    pen needle, diabetic (BD Ultra-Fine Mini Pen Needle) 31 gauge x 3/16\" needle INJECT INSULIN 3 TIMES DAILY 300 each 3    potassium citrate CR (Urocit-K-10) 10 mEq ER tablet Take 1 tablet (10 mEq) by mouth once daily.      rosuvastatin (Crestor) 20 mg tablet TAKE 1 TABLET BY MOUTH EVERY DAY 90 tablet 3    torsemide (Demadex) 20 mg tablet TAKE 1 TABLET BY MOUTH TWICE A  tablet 3     No current facility-administered medications for this visit.       Visit Vitals  Pulse 74   Ht 1.753 m (5' 9\")   Wt 135 kg (298 lb)   SpO2 98%   BMI 44.01 kg/m²   OB Status Postmenopausal   Smoking Status Never   BSA 2.56 m²        Objective     Constitutional:       Appearance: Not in distress.   Neck:      Vascular: JVD normal.   Pulmonary:      Breath sounds: Normal breath sounds.   Cardiovascular:      Normal rate. Regular rhythm. Normal S1. Normal S2.       Murmurs: There is no murmur.      No gallop.    Pulses:     Intact distal pulses.   Edema:     " Peripheral edema 2+ to 3+.  Abdominal:      General: There is no distension.      Palpations: Abdomen is soft.   Neurological:      Mental Status: Alert.         Lab Review:   Lab Results   Component Value Date     03/10/2025    K 4.1 03/10/2025     03/10/2025    CO2 28 03/10/2025    BUN 32 (H) 03/10/2025    CREATININE 1.45 (H) 03/10/2025    GLUCOSE 85 03/10/2025    CALCIUM 8.3 (L) 03/10/2025     Lab Results   Component Value Date    CHOL 148 03/01/2023    TRIG 254 (H) 03/01/2023    HDL 45.8 03/01/2023       Assessment:    1.  Coronary disease.  Continue current medical management.    2.  Lower extremity edema.  Fairly marked lower extremity edema probably secondary to venous insufficiency.  Could also be secondary to lymphedema.  Will get lower extremity venous reflux study.  Further recommendations to follow at that point in time.  We are also going to increase her torsemide to 40 mg daily.    3.  Chronic renal sufficiency.  And December 2024 her creatinine was 1.6.  Latest creatinine we have is 1.43.    The patient's echocardiographic study performed in December 2024 at [facility] Was normal.

## 2025-07-01 NOTE — PATIENT INSTRUCTIONS
We will arrange for you to have a venous reflux study.    Take the torsemide 20 mg two tablets daily    Start Jardiance 10 mg daily    Blood test in 1-2 weeks.

## 2025-07-18 LAB
NON-UH HIE BUN/CREAT RATIO: 18.8
NON-UH HIE BUN: 30 MG/DL (ref 10–20)
NON-UH HIE CALCIUM: 8.3 MG/DL (ref 8.5–10.5)
NON-UH HIE CALCULATED OSMOLALITY: 298 MOSM/KG (ref 275–295)
NON-UH HIE CHLORIDE: 104 MMOL/L (ref 100–109)
NON-UH HIE CO2, VENOUS: 34.6 MMOL/L (ref 21–32)
NON-UH HIE CREATININE: 1.6 MG/DL (ref 0.6–1)
NON-UH HIE GFR ESTIMATED: 32
NON-UH HIE GLUCOSE: 151 MG/DL (ref 72–100)
NON-UH HIE K: 3.8 MMOL/L (ref 3.5–5.1)
NON-UH HIE NA: 145 MMOL/L (ref 135–145)

## 2025-08-06 ENCOUNTER — APPOINTMENT (OUTPATIENT)
Dept: PRIMARY CARE | Facility: CLINIC | Age: 75
End: 2025-08-06
Payer: MEDICARE

## 2025-08-13 ENCOUNTER — HOSPITAL ENCOUNTER (OUTPATIENT)
Dept: VASCULAR MEDICINE | Facility: CLINIC | Age: 75
Discharge: HOME | End: 2025-08-13
Payer: MEDICARE

## 2025-08-13 ENCOUNTER — OFFICE VISIT (OUTPATIENT)
Dept: CARDIOLOGY | Facility: CLINIC | Age: 75
End: 2025-08-13
Payer: MEDICARE

## 2025-08-13 VITALS
WEIGHT: 293 LBS | SYSTOLIC BLOOD PRESSURE: 136 MMHG | DIASTOLIC BLOOD PRESSURE: 70 MMHG | BODY MASS INDEX: 43.4 KG/M2 | HEIGHT: 69 IN

## 2025-08-13 DIAGNOSIS — R06.09 EXERTIONAL DYSPNEA: ICD-10-CM

## 2025-08-13 DIAGNOSIS — R60.0 LEG EDEMA: ICD-10-CM

## 2025-08-13 DIAGNOSIS — I50.32 CHRONIC DIASTOLIC CONGESTIVE HEART FAILURE: ICD-10-CM

## 2025-08-13 DIAGNOSIS — M17.12 ARTHRITIS OF LEFT KNEE: ICD-10-CM

## 2025-08-13 DIAGNOSIS — E78.2 MIXED HYPERLIPIDEMIA: ICD-10-CM

## 2025-08-13 DIAGNOSIS — M17.11 ARTHRITIS OF RIGHT KNEE: Primary | ICD-10-CM

## 2025-08-13 DIAGNOSIS — Z95.1 HISTORY OF CORONARY ARTERY BYPASS GRAFT X 3: ICD-10-CM

## 2025-08-13 DIAGNOSIS — I10 PRIMARY HYPERTENSION: ICD-10-CM

## 2025-08-13 DIAGNOSIS — Z95.5 HISTORY OF CORONARY ARTERY STENT PLACEMENT: ICD-10-CM

## 2025-08-13 DIAGNOSIS — I25.10 CORONARY ARTERY DISEASE INVOLVING NATIVE CORONARY ARTERY OF NATIVE HEART WITHOUT ANGINA PECTORIS: ICD-10-CM

## 2025-08-13 PROCEDURE — 1159F MED LIST DOCD IN RCRD: CPT | Performed by: INTERNAL MEDICINE

## 2025-08-13 PROCEDURE — 93970 EXTREMITY STUDY: CPT

## 2025-08-13 PROCEDURE — 3075F SYST BP GE 130 - 139MM HG: CPT | Performed by: INTERNAL MEDICINE

## 2025-08-13 PROCEDURE — 99214 OFFICE O/P EST MOD 30 MIN: CPT | Performed by: INTERNAL MEDICINE

## 2025-08-13 PROCEDURE — 3078F DIAST BP <80 MM HG: CPT | Performed by: INTERNAL MEDICINE

## 2025-08-13 PROCEDURE — 3051F HG A1C>EQUAL 7.0%<8.0%: CPT | Performed by: INTERNAL MEDICINE

## 2025-08-13 PROCEDURE — 99212 OFFICE O/P EST SF 10 MIN: CPT | Mod: 25

## 2025-08-13 PROCEDURE — 3008F BODY MASS INDEX DOCD: CPT | Performed by: INTERNAL MEDICINE

## 2025-08-13 PROCEDURE — 93970 EXTREMITY STUDY: CPT | Performed by: INTERNAL MEDICINE

## 2025-08-13 RX ORDER — PREDNISONE 5 MG/1
5 TABLET ORAL DAILY
Qty: 30 TABLET | Refills: 5 | Status: SHIPPED | OUTPATIENT
Start: 2025-08-13 | End: 2026-08-13

## 2025-09-26 ENCOUNTER — APPOINTMENT (OUTPATIENT)
Dept: PRIMARY CARE | Facility: CLINIC | Age: 75
End: 2025-09-26
Payer: MEDICARE